# Patient Record
Sex: MALE | Race: WHITE | NOT HISPANIC OR LATINO | Employment: FULL TIME | URBAN - METROPOLITAN AREA
[De-identification: names, ages, dates, MRNs, and addresses within clinical notes are randomized per-mention and may not be internally consistent; named-entity substitution may affect disease eponyms.]

---

## 2017-08-10 ENCOUNTER — TELEPHONE (OUTPATIENT)
Dept: PRIMARY CARE CLINIC | Facility: CLINIC | Age: 52
End: 2017-08-10

## 2017-08-10 ENCOUNTER — OFFICE VISIT (OUTPATIENT)
Dept: PRIMARY CARE CLINIC | Facility: CLINIC | Age: 52
End: 2017-08-10
Payer: COMMERCIAL

## 2017-08-10 VITALS
DIASTOLIC BLOOD PRESSURE: 74 MMHG | OXYGEN SATURATION: 96 % | WEIGHT: 293 LBS | RESPIRATION RATE: 18 BRPM | BODY MASS INDEX: 41.02 KG/M2 | TEMPERATURE: 98 F | HEART RATE: 83 BPM | SYSTOLIC BLOOD PRESSURE: 114 MMHG | HEIGHT: 71 IN

## 2017-08-10 DIAGNOSIS — L02.11 ABSCESS OF SKIN OF NECK: Primary | ICD-10-CM

## 2017-08-10 PROCEDURE — 3008F BODY MASS INDEX DOCD: CPT | Mod: S$GLB,,, | Performed by: FAMILY MEDICINE

## 2017-08-10 PROCEDURE — 10060 I&D ABSCESS SIMPLE/SINGLE: CPT | Mod: S$GLB,,, | Performed by: FAMILY MEDICINE

## 2017-08-10 PROCEDURE — 99213 OFFICE O/P EST LOW 20 MIN: CPT | Mod: 25,S$GLB,, | Performed by: FAMILY MEDICINE

## 2017-08-10 RX ORDER — MELOXICAM 7.5 MG/1
1 TABLET ORAL DAILY PRN
COMMUNITY
Start: 2017-08-08 | End: 2017-09-14

## 2017-08-10 RX ORDER — LEVOTHYROXINE SODIUM 100 UG/1
100 TABLET ORAL DAILY
COMMUNITY
End: 2017-09-12 | Stop reason: SDUPTHER

## 2017-08-10 RX ORDER — ACETAMINOPHEN AND CODEINE PHOSPHATE 300; 30 MG/1; MG/1
1 TABLET ORAL DAILY PRN
Refills: 0 | COMMUNITY
Start: 2017-07-13 | End: 2017-09-14

## 2017-08-10 RX ORDER — HYDROCODONE BITARTRATE AND ACETAMINOPHEN 5; 325 MG/1; MG/1
1 TABLET ORAL EVERY 6 HOURS PRN
Qty: 15 TABLET | Refills: 0 | Status: SHIPPED | OUTPATIENT
Start: 2017-08-10 | End: 2017-09-14

## 2017-08-10 RX ORDER — PANTOPRAZOLE SODIUM 40 MG/1
1 TABLET, DELAYED RELEASE ORAL DAILY
COMMUNITY
Start: 2017-08-08 | End: 2017-09-14

## 2017-08-10 RX ORDER — CITALOPRAM 40 MG/1
40 TABLET, FILM COATED ORAL DAILY
COMMUNITY
End: 2018-03-15 | Stop reason: SDUPTHER

## 2017-08-10 RX ORDER — CLINDAMYCIN HYDROCHLORIDE 300 MG/1
1 CAPSULE ORAL EVERY 6 HOURS
COMMUNITY
Start: 2017-08-08 | End: 2017-09-14

## 2017-08-10 NOTE — PROGRESS NOTES
Subjective:       Patient ID: Mahendra Callahan is a 52 y.o. male.    Chief Complaint: Abscess (absecess on neck x1 week. states went to urgent care 2 days ago. )    Abscess   Chronicity:  NewProgression Since Onset: worsening  Size:  3-5cm  Location:  Head/neck  Associated Symptoms: no fever, no chills, no sweats  Characteristics: painful    Characteristics: not draining    Treatments Tried:  Nothing (went to  yesterday, got rx for abx, but hasn't started taking)  Relieved by:  Nothing    Review of Systems   Constitutional: Negative for chills and fever.       Objective:      Physical Exam   Constitutional: He appears well-developed and well-nourished.   Cardiovascular: Normal rate and regular rhythm.    Pulmonary/Chest: Effort normal and breath sounds normal.   Skin:   Fluctuant, tender 3cm abscess to left side of neck       Assessment:       1. Abscess of skin of neck        Plan:       Abscess of skin of neck  Comments:  pt instructed to start taking clindamycin  Orders:  -     INCISION AND DRAINAGE  -     hydrocodone-acetaminophen 5-325mg (NORCO) 5-325 mg per tablet; Take 1 tablet by mouth every 6 (six) hours as needed for Pain.  Dispense: 15 tablet; Refill: 0

## 2017-08-10 NOTE — PROCEDURES
"Incision & Drainage  Date/Time: 8/10/2017 1:59 PM  Performed by: APRIL BUENROSTRO  Authorized by: APRIL BUENROSTRO     Time out: Immediately prior to procedure a "time out" was called to verify the correct patient, procedure, equipment, support staff and site/side marked as required.    Consent Done?:  Yes (Verbal)    Type:  Abscess  Body area:  Head/neck  Anesthesia:  Local infiltration  Local anesthetic: lidocaine 1% without epinephrine  Anesthetic total (ml):  2  Scalpel size:  11  Incision type:  Single straight  Complexity:  Simple  Drainage:  Pus and bloody  Drainage amount:  Moderate  Wound treatment:  Wound packed  Packing material:  1/4 in gauze  Patient tolerance:  Patient tolerated the procedure well with no immediate complications    Pt instructed to leave dressing in place for 48h, then remove packing and change dressing daily      "

## 2017-09-12 RX ORDER — LEVOTHYROXINE SODIUM 100 UG/1
100 TABLET ORAL DAILY
Qty: 30 TABLET | Refills: 5 | Status: SHIPPED | OUTPATIENT
Start: 2017-09-12 | End: 2018-03-16 | Stop reason: SDUPTHER

## 2017-09-13 NOTE — TELEPHONE ENCOUNTER
----- Message from Mariana Murillo sent at 9/13/2017  1:08 PM CDT -----  Contact: Maya with [facility] phone [phone] fax [phone]  Maya with [facility] phone [phone] fax [phone], Calling for refill for Rx Lebothyroxine 125 mg quantity 30. Please advise. Thanks

## 2017-09-14 ENCOUNTER — TELEPHONE (OUTPATIENT)
Dept: PRIMARY CARE CLINIC | Facility: CLINIC | Age: 52
End: 2017-09-14

## 2017-09-14 ENCOUNTER — OFFICE VISIT (OUTPATIENT)
Dept: PRIMARY CARE CLINIC | Facility: CLINIC | Age: 52
End: 2017-09-14
Payer: COMMERCIAL

## 2017-09-14 VITALS
DIASTOLIC BLOOD PRESSURE: 71 MMHG | BODY MASS INDEX: 40.87 KG/M2 | HEART RATE: 88 BPM | TEMPERATURE: 98 F | SYSTOLIC BLOOD PRESSURE: 128 MMHG | RESPIRATION RATE: 18 BRPM | WEIGHT: 293 LBS | OXYGEN SATURATION: 97 %

## 2017-09-14 DIAGNOSIS — M1A.0720 CHRONIC GOUT OF LEFT FOOT, UNSPECIFIED CAUSE: Primary | ICD-10-CM

## 2017-09-14 PROBLEM — E03.9 HYPOTHYROIDISM: Status: ACTIVE | Noted: 2017-09-14

## 2017-09-14 PROBLEM — M10.9 GOUT OF LEFT FOOT: Status: ACTIVE | Noted: 2017-09-14

## 2017-09-14 PROCEDURE — 3008F BODY MASS INDEX DOCD: CPT | Mod: S$GLB,,, | Performed by: FAMILY MEDICINE

## 2017-09-14 PROCEDURE — 96372 THER/PROPH/DIAG INJ SC/IM: CPT | Mod: S$GLB,,, | Performed by: FAMILY MEDICINE

## 2017-09-14 PROCEDURE — 99213 OFFICE O/P EST LOW 20 MIN: CPT | Mod: 25,S$GLB,, | Performed by: FAMILY MEDICINE

## 2017-09-14 RX ORDER — METHYLPREDNISOLONE 4 MG/1
TABLET ORAL
Qty: 1 PACKAGE | Refills: 0 | Status: SHIPPED | OUTPATIENT
Start: 2017-09-14 | End: 2017-12-13

## 2017-09-14 RX ORDER — TRIAMCINOLONE ACETONIDE 40 MG/ML
80 INJECTION, SUSPENSION INTRA-ARTICULAR; INTRAMUSCULAR
Status: COMPLETED | OUTPATIENT
Start: 2017-09-14 | End: 2017-09-14

## 2017-09-14 RX ORDER — INDOMETHACIN 50 MG/1
50 CAPSULE ORAL 3 TIMES DAILY PRN
COMMUNITY
End: 2017-09-14 | Stop reason: SDUPTHER

## 2017-09-14 RX ORDER — INDOMETHACIN 50 MG/1
50 CAPSULE ORAL 3 TIMES DAILY PRN
Qty: 60 CAPSULE | Refills: 2 | Status: SHIPPED | OUTPATIENT
Start: 2017-09-14 | End: 2018-08-27

## 2017-09-14 RX ADMIN — TRIAMCINOLONE ACETONIDE 80 MG: 40 INJECTION, SUSPENSION INTRA-ARTICULAR; INTRAMUSCULAR at 05:09

## 2017-09-14 NOTE — TELEPHONE ENCOUNTER
----- Message from Reyes CURIEL Frisard sent at 9/14/2017 12:57 PM CDT -----  Contact: same  Patient called in and stated that he is suffering with his gout really bad and wanted to see if he could be seen today Thursday 9/14.  Patient call back number is [phone]

## 2017-09-14 NOTE — PROGRESS NOTES
Subjective:       Patient ID: Mahendra Callahan is a 52 y.o. male.    Chief Complaint: Gout and Other (pt states he had a red dot under his eye this morning and now it's all brused )    Gout flare for the past 2-3 days, getting worse. Pain mainly in left foot, has calf hurting b/c affecting way he is walking. Taking indomethacin, but not helping like it usually does      Review of Systems   Constitutional: Negative for chills and fever.   Respiratory: Negative for shortness of breath.    Cardiovascular: Negative for chest pain.       Objective:        Current Outpatient Prescriptions:     citalopram (CELEXA) 40 MG tablet, Take 40 mg by mouth once daily., Disp: , Rfl:     indomethacin (INDOCIN) 50 MG capsule, Take 1 capsule (50 mg total) by mouth 3 (three) times daily as needed (gout)., Disp: 60 capsule, Rfl: 2    levothyroxine (SYNTHROID) 100 MCG tablet, Take 1 tablet (100 mcg total) by mouth once daily., Disp: 30 tablet, Rfl: 5    methylPREDNISolone (MEDROL DOSEPACK) 4 mg tablet, use as directed, Disp: 1 Package, Rfl: 0    Current Facility-Administered Medications:     triamcinolone acetonide injection 80 mg, 80 mg, Intramuscular, 1 time in Clinic/HOD, Daniel Brooks MD  Vitals:    09/14/17 1639   BP: 128/71   BP Location: Left arm   Patient Position: Sitting   BP Method: Large (Automatic)   Pulse: 88   Resp: 18   Temp: 98.1 °F (36.7 °C)   TempSrc: Oral   SpO2: 97%   Weight: 132.9 kg (293 lb)     Physical Exam   Constitutional: He is oriented to person, place, and time. He appears well-developed and well-nourished.   HENT:   Head: Normocephalic and atraumatic.   Cardiovascular: Normal rate, regular rhythm and normal heart sounds.    Pulmonary/Chest: Effort normal and breath sounds normal.   Musculoskeletal: He exhibits no edema.   Left 1st MTP joint erythematous, warm, tender and edematous   Neurological: He is alert and oriented to person, place, and time.   Skin: Skin is warm and dry.   Vitals reviewed.       Assessment:       1. Chronic gout of left foot, unspecified cause        Plan:       Chronic gout of left foot, unspecified cause  Comments:  start medrol dose pack tomorrow; pt instructed not to take indomethacin while on steroids  Orders:  -     triamcinolone acetonide injection 80 mg; Inject 2 mLs (80 mg total) into the muscle one time.  -     methylPREDNISolone (MEDROL DOSEPACK) 4 mg tablet; use as directed  Dispense: 1 Package; Refill: 0  -     indomethacin (INDOCIN) 50 MG capsule; Take 1 capsule (50 mg total) by mouth 3 (three) times daily as needed (gout).  Dispense: 60 capsule; Refill: 2      Medication List with Changes/Refills   New Medications    METHYLPREDNISOLONE (MEDROL DOSEPACK) 4 MG TABLET    use as directed   Current Medications    CITALOPRAM (CELEXA) 40 MG TABLET    Take 40 mg by mouth once daily.    LEVOTHYROXINE (SYNTHROID) 100 MCG TABLET    Take 1 tablet (100 mcg total) by mouth once daily.   Changed and/or Refilled Medications    Modified Medication Previous Medication    INDOMETHACIN (INDOCIN) 50 MG CAPSULE indomethacin (INDOCIN) 50 MG capsule       Take 1 capsule (50 mg total) by mouth 3 (three) times daily as needed (gout).    Take 50 mg by mouth 3 (three) times daily as needed.   Discontinued Medications    ACETAMINOPHEN-CODEINE 300-30MG (TYLENOL #3) 300-30 MG TAB    Take 1 tablet by mouth daily as needed.    CLINDAMYCIN (CLEOCIN) 300 MG CAPSULE    Take 1 capsule by mouth every 6 (six) hours.     HYDROCODONE-ACETAMINOPHEN 5-325MG (NORCO) 5-325 MG PER TABLET    Take 1 tablet by mouth every 6 (six) hours as needed for Pain.    MELOXICAM (MOBIC) 7.5 MG TABLET    Take 1 tablet by mouth daily as needed.    PANTOPRAZOLE (PROTONIX) 40 MG TABLET    Take 1 tablet by mouth once daily.

## 2017-12-12 ENCOUNTER — NURSE TRIAGE (OUTPATIENT)
Dept: ADMINISTRATIVE | Facility: CLINIC | Age: 52
End: 2017-12-12

## 2017-12-13 ENCOUNTER — TELEPHONE (OUTPATIENT)
Dept: PRIMARY CARE CLINIC | Facility: CLINIC | Age: 52
End: 2017-12-13

## 2017-12-13 RX ORDER — METHYLPREDNISOLONE 4 MG/1
TABLET ORAL
Qty: 1 PACKAGE | Refills: 0 | Status: SHIPPED | OUTPATIENT
Start: 2017-12-13 | End: 2018-04-27

## 2017-12-13 NOTE — TELEPHONE ENCOUNTER
----- Message from Mariana Murillo sent at 12/12/2017  2:21 PM CST -----  Contact: Patient  Mahendra, patient [phone], Calling because he is on vacation in [place] and his gout is flaring up. Could you call something to him:    [facility]  [address]  [place], [place]  Phone [phone]

## 2017-12-13 NOTE — TELEPHONE ENCOUNTER
"    Reason for Disposition   Foot pain (all triage questions negative)    Answer Assessment - Initial Assessment Questions  1. ONSET: "When did the pain start?"       2 days ago   2. LOCATION: "Where is the pain located?"       Left foot   3. PAIN: "How bad is the pain?"    (Scale 1-10; or mild, moderate, severe)    -  MILD (1-3): doesn't interfere with normal activities     -  MODERATE (4-7): interferes with normal activities (e.g., work or school) or awakens from sleep, limping     -  SEVERE (8-10): excruciating pain, unable to do any normal activities, unable to walk      8-9  4. WORK OR EXERCISE: "Has there been any recent work or exercise that involved this part of the body?"       *No Answer*  5. CAUSE: "What do you think is causing the foot pain?"      *No Answer*  6. OTHER SYMPTOMS: "Do you have any other symptoms?" (e.g., leg pain, rash, fever, numbness)      no  7. PREGNANCY: "Is there any chance you are pregnant?" "When was your last menstrual period?"      *No Answer*    Protocols used:  FOOT PAIN-A-AH    Pt states he is on vacation and would like pain Rx called in for gout. Pt states he called PCP office earlier and did not receive call back. Please call pt to advise.   "

## 2018-03-15 RX ORDER — CITALOPRAM 40 MG/1
TABLET, FILM COATED ORAL
Qty: 30 TABLET | Refills: 5 | Status: SHIPPED | OUTPATIENT
Start: 2018-03-15 | End: 2018-08-29 | Stop reason: SDUPTHER

## 2018-03-16 RX ORDER — LEVOTHYROXINE SODIUM 100 UG/1
100 TABLET ORAL DAILY
Qty: 30 TABLET | Refills: 5 | Status: SHIPPED | OUTPATIENT
Start: 2018-03-16 | End: 2018-08-30 | Stop reason: SDUPTHER

## 2018-04-27 ENCOUNTER — OFFICE VISIT (OUTPATIENT)
Dept: PRIMARY CARE CLINIC | Facility: CLINIC | Age: 53
End: 2018-04-27
Payer: COMMERCIAL

## 2018-04-27 ENCOUNTER — TELEPHONE (OUTPATIENT)
Dept: PRIMARY CARE CLINIC | Facility: CLINIC | Age: 53
End: 2018-04-27

## 2018-04-27 VITALS
OXYGEN SATURATION: 96 % | RESPIRATION RATE: 18 BRPM | HEART RATE: 92 BPM | HEIGHT: 66 IN | SYSTOLIC BLOOD PRESSURE: 126 MMHG | TEMPERATURE: 98 F | DIASTOLIC BLOOD PRESSURE: 77 MMHG | WEIGHT: 295 LBS | BODY MASS INDEX: 47.41 KG/M2

## 2018-04-27 DIAGNOSIS — M79.605 PAIN IN BOTH LOWER EXTREMITIES: ICD-10-CM

## 2018-04-27 DIAGNOSIS — Z13.6 ENCOUNTER FOR SCREENING FOR CARDIOVASCULAR DISORDERS: ICD-10-CM

## 2018-04-27 DIAGNOSIS — R63.5 WEIGHT GAIN: ICD-10-CM

## 2018-04-27 DIAGNOSIS — E03.9 HYPOTHYROIDISM, UNSPECIFIED TYPE: ICD-10-CM

## 2018-04-27 DIAGNOSIS — Z13.1 SCREENING FOR DIABETES MELLITUS: ICD-10-CM

## 2018-04-27 DIAGNOSIS — M79.604 PAIN IN BOTH LOWER EXTREMITIES: ICD-10-CM

## 2018-04-27 DIAGNOSIS — J01.00 ACUTE NON-RECURRENT MAXILLARY SINUSITIS: Primary | ICD-10-CM

## 2018-04-27 DIAGNOSIS — E66.01 MORBID OBESITY WITH BMI OF 45.0-49.9, ADULT: ICD-10-CM

## 2018-04-27 DIAGNOSIS — Z12.5 PROSTATE CANCER SCREENING: ICD-10-CM

## 2018-04-27 DIAGNOSIS — M1A.0720 CHRONIC GOUT OF LEFT FOOT, UNSPECIFIED CAUSE: ICD-10-CM

## 2018-04-27 DIAGNOSIS — Z11.59 NEED FOR HEPATITIS C SCREENING TEST: ICD-10-CM

## 2018-04-27 PROCEDURE — 99214 OFFICE O/P EST MOD 30 MIN: CPT | Mod: 25,S$GLB,, | Performed by: FAMILY MEDICINE

## 2018-04-27 PROCEDURE — 96372 THER/PROPH/DIAG INJ SC/IM: CPT | Mod: S$GLB,,, | Performed by: FAMILY MEDICINE

## 2018-04-27 PROCEDURE — 99999 PR PBB SHADOW E&M-EST. PATIENT-LVL III: CPT | Mod: PBBFAC,,, | Performed by: FAMILY MEDICINE

## 2018-04-27 RX ORDER — CLARITHROMYCIN 500 MG/1
500 TABLET, FILM COATED ORAL EVERY 12 HOURS
Qty: 20 TABLET | Refills: 0 | Status: SHIPPED | OUTPATIENT
Start: 2018-04-27 | End: 2018-05-07 | Stop reason: ALTCHOICE

## 2018-04-27 RX ORDER — BETAMETHASONE SODIUM PHOSPHATE AND BETAMETHASONE ACETATE 3; 3 MG/ML; MG/ML
12 INJECTION, SUSPENSION INTRA-ARTICULAR; INTRALESIONAL; INTRAMUSCULAR; SOFT TISSUE
Status: COMPLETED | OUTPATIENT
Start: 2018-04-27 | End: 2018-04-27

## 2018-04-27 RX ORDER — HYDROCODONE BITARTRATE AND HOMATROPINE METHYLBROMIDE ORAL SOLUTION 5; 1.5 MG/5ML; MG/5ML
5 LIQUID ORAL EVERY 6 HOURS PRN
Qty: 120 ML | Refills: 0 | Status: SHIPPED | OUTPATIENT
Start: 2018-04-27 | End: 2018-05-02

## 2018-04-27 RX ADMIN — BETAMETHASONE SODIUM PHOSPHATE AND BETAMETHASONE ACETATE 12 MG: 3; 3 INJECTION, SUSPENSION INTRA-ARTICULAR; INTRALESIONAL; INTRAMUSCULAR; SOFT TISSUE at 10:04

## 2018-04-27 NOTE — TELEPHONE ENCOUNTER
----- Message from Nedra Melgoza sent at 4/27/2018 10:45 AM CDT -----  Type:  Pharmacy Calling to Clarify an RX    Name of Caller:  Cristóbal  Pharmacy Name:  [facility]  Prescription Name: clarithromycin (BIAXIN) 500 MG tablet  What do they need to clarify?:    Best Call Back Number:  [phone]  Additional Information:  Medication has an interaction with Citlaotram states it can cause arhythmia,  contact pharmacy to advise

## 2018-04-27 NOTE — PROGRESS NOTES
"Subjective:       Patient ID: Mahendra Callahan is a 52 y.o. male.    Chief Complaint: Sore Throat (patient says he feels like he swallowed razor blades )    Also complains of weight gain and difficulty losing weight.  Says he and his wife have been dieting, but upon further questioning, turns out that he is still eating a fairly high carbohydrate diet, including biscuits, pasta, bread, cookies and milk.  Complains of generalized fatigue and myalgias, particularly in his legs.  Due for labs.  He is to take a slightly higher dose of levothyroxine, thinks it might be related to that.      Sore Throat    This is a new problem. The current episode started in the past 7 days. The problem has been gradually worsening. Neither side of throat is experiencing more pain than the other. The maximum temperature recorded prior to his arrival was 101 - 101.9 F. The fever has been present for 1 to 2 days. The pain is moderate. Associated symptoms include congestion, coughing, headaches and trouble swallowing. Pertinent negatives include no ear discharge, shortness of breath or vomiting. He has had no exposure to strep or mono.     Review of Systems   Constitutional: Positive for chills, diaphoresis, fatigue and fever.   HENT: Positive for congestion, sore throat and trouble swallowing. Negative for ear discharge.    Eyes: Negative for visual disturbance.   Respiratory: Positive for cough. Negative for shortness of breath.    Gastrointestinal: Negative for vomiting.   Genitourinary: Negative for difficulty urinating.   Musculoskeletal: Positive for myalgias.   Skin: Negative for rash.   Neurological: Positive for headaches.       Objective:      Vitals:    04/27/18 1000   BP: 126/77   BP Location: Left arm   Patient Position: Sitting   BP Method: Large (Automatic)   Pulse: 92   Resp: 18   Temp: 98 °F (36.7 °C)   TempSrc: Oral   SpO2: 96%   Weight: 133.8 kg (295 lb)   Height: 5' 6" (1.676 m)     Physical Exam   Constitutional: He is " oriented to person, place, and time. He appears well-developed and well-nourished.   HENT:   Head: Normocephalic and atraumatic.   Right Ear: Tympanic membrane normal.   Left Ear: Tympanic membrane normal.   Nose: Right sinus exhibits maxillary sinus tenderness. Left sinus exhibits maxillary sinus tenderness.   Mouth/Throat: Oropharynx is clear and moist and mucous membranes are normal.   Eyes: EOM are normal.   Neck: Neck supple. No JVD present.   Cardiovascular: Normal rate, regular rhythm and normal heart sounds.    Pulmonary/Chest: Effort normal and breath sounds normal.   Musculoskeletal: He exhibits no edema.   Neurological: He is alert and oriented to person, place, and time.   Skin: Skin is warm and dry.   Psychiatric: He has a normal mood and affect. His behavior is normal.       Assessment:       1. Acute non-recurrent maxillary sinusitis    2. Hypothyroidism, unspecified type    3. Chronic gout of left foot, unspecified cause    4. Encounter for screening for cardiovascular disorders    5. Need for hepatitis C screening test    6. Weight gain    7. Screening for diabetes mellitus    8. Morbid obesity with BMI of 45.0-49.9, adult    9. Prostate cancer screening    10. Pain in both lower extremities        Plan:       Acute non-recurrent maxillary sinusitis  -     betamethasone acetate-betamethasone sodium phosphate injection 12 mg; Inject 2 mLs (12 mg total) into the muscle one time.  -     clarithromycin (BIAXIN) 500 MG tablet; Take 1 tablet (500 mg total) by mouth every 12 (twelve) hours.  Dispense: 20 tablet; Refill: 0  -     hydrocodone-homatropine 5-1.5 mg/5 ml (HYCODAN) 5-1.5 mg/5 mL Syrp; Take 5 mLs by mouth every 6 (six) hours as needed (cough).  Dispense: 120 mL; Refill: 0    Hypothyroidism, unspecified type  -     CBC auto differential; Future; Expected date: 05/11/2018  -     TSH; Future; Expected date: 05/11/2018  -     T4, free; Future; Expected date: 05/11/2018  -     T3; Future; Expected  date: 05/11/2018    Chronic gout of left foot, unspecified cause  -     Uric acid; Future; Expected date: 05/11/2018    Encounter for screening for cardiovascular disorders  -     CBC auto differential; Future; Expected date: 05/11/2018  -     Lipid panel; Future; Expected date: 05/11/2018    Need for hepatitis C screening test  -     Hepatitis C antibody; Future; Expected date: 05/11/2018    Weight gain  -     Comprehensive metabolic panel; Future; Expected date: 05/11/2018  -     Hemoglobin A1c; Future; Expected date: 05/11/2018    Screening for diabetes mellitus  -     Hemoglobin A1c; Future; Expected date: 05/11/2018    Morbid obesity with BMI of 45.0-49.9, adult  Comments:  stressed importance of dramatically cutting carb intake to facilitate weight loss  Orders:  -     Hemoglobin A1c; Future; Expected date: 05/11/2018    Prostate cancer screening  -     PSA, Screening; Future; Expected date: 05/11/2018    Pain in both lower extremities  -     Vitamin D; Future; Expected date: 05/11/2018      Medication List with Changes/Refills   New Medications    CLARITHROMYCIN (BIAXIN) 500 MG TABLET    Take 1 tablet (500 mg total) by mouth every 12 (twelve) hours.    HYDROCODONE-HOMATROPINE 5-1.5 MG/5 ML (HYCODAN) 5-1.5 MG/5 ML SYRP    Take 5 mLs by mouth every 6 (six) hours as needed (cough).   Current Medications    CITALOPRAM (CELEXA) 40 MG TABLET    TAKE ONE TABLET BY MOUTH DAILY    INDOMETHACIN (INDOCIN) 50 MG CAPSULE    Take 1 capsule (50 mg total) by mouth 3 (three) times daily as needed (gout).    LEVOTHYROXINE (SYNTHROID) 100 MCG TABLET    TAKE 1 TABLET (100 MCG TOTAL) BY MOUTH ONCE DAILY.   Discontinued Medications    METHYLPREDNISOLONE (MEDROL DOSEPACK) 4 MG TABLET    use as directed

## 2018-05-02 ENCOUNTER — TELEPHONE (OUTPATIENT)
Dept: PRIMARY CARE CLINIC | Facility: CLINIC | Age: 53
End: 2018-05-02

## 2018-05-02 RX ORDER — PROMETHAZINE HYDROCHLORIDE AND DEXTROMETHORPHAN HYDROBROMIDE 6.25; 15 MG/5ML; MG/5ML
5 SYRUP ORAL EVERY 6 HOURS PRN
Qty: 180 ML | Refills: 1 | Status: SHIPPED | OUTPATIENT
Start: 2018-05-02 | End: 2018-05-04 | Stop reason: SDUPTHER

## 2018-05-02 NOTE — TELEPHONE ENCOUNTER
----- Message from Mariana Murillo sent at 5/2/2018  2:12 PM CDT -----  Contact: Patient  Type: Needs Medical Advice    Who Called:  Mahendra,patient  Symptoms (please be specific):  Hallucinating  How long has patient had these symptoms:  Since he started taking Rx hydrocodone-homatropine 5-1.5 mg/5 ml (HYCODAN) 5-1.5 mg/5 mL Syrp 120 mL   Pharmacy name and phone #:    [facility] #[id number] - [place] - [address]  [address] [place] [address]  Phone: [phone] Fax: [phone]  Best Call Back Number: [phone]  Additional Information: Is there something else he can take. Please call him. Thanks.

## 2018-05-04 ENCOUNTER — OFFICE VISIT (OUTPATIENT)
Dept: PRIMARY CARE CLINIC | Facility: CLINIC | Age: 53
End: 2018-05-04
Payer: COMMERCIAL

## 2018-05-04 VITALS
HEIGHT: 69 IN | BODY MASS INDEX: 42.36 KG/M2 | WEIGHT: 286 LBS | RESPIRATION RATE: 18 BRPM | TEMPERATURE: 99 F | HEART RATE: 66 BPM | SYSTOLIC BLOOD PRESSURE: 137 MMHG | OXYGEN SATURATION: 96 % | DIASTOLIC BLOOD PRESSURE: 84 MMHG

## 2018-05-04 DIAGNOSIS — J40 BRONCHITIS: ICD-10-CM

## 2018-05-04 DIAGNOSIS — J32.9 SINUSITIS, UNSPECIFIED CHRONICITY, UNSPECIFIED LOCATION: Primary | ICD-10-CM

## 2018-05-04 PROCEDURE — 99999 PR PBB SHADOW E&M-EST. PATIENT-LVL IV: CPT | Mod: PBBFAC,,, | Performed by: INTERNAL MEDICINE

## 2018-05-04 PROCEDURE — 96372 THER/PROPH/DIAG INJ SC/IM: CPT | Mod: S$GLB,,, | Performed by: INTERNAL MEDICINE

## 2018-05-04 PROCEDURE — 3008F BODY MASS INDEX DOCD: CPT | Mod: CPTII,S$GLB,, | Performed by: INTERNAL MEDICINE

## 2018-05-04 PROCEDURE — 99213 OFFICE O/P EST LOW 20 MIN: CPT | Mod: 25,S$GLB,, | Performed by: INTERNAL MEDICINE

## 2018-05-04 RX ORDER — PREDNISONE 20 MG/1
20 TABLET ORAL 2 TIMES DAILY
Qty: 12 TABLET | Refills: 0 | Status: ON HOLD | OUTPATIENT
Start: 2018-05-04 | End: 2018-05-09 | Stop reason: HOSPADM

## 2018-05-04 RX ORDER — BETAMETHASONE SODIUM PHOSPHATE AND BETAMETHASONE ACETATE 3; 3 MG/ML; MG/ML
6 INJECTION, SUSPENSION INTRA-ARTICULAR; INTRALESIONAL; INTRAMUSCULAR; SOFT TISSUE
Status: COMPLETED | OUTPATIENT
Start: 2018-05-04 | End: 2018-05-04

## 2018-05-04 RX ORDER — ALBUTEROL SULFATE 90 UG/1
2 AEROSOL, METERED RESPIRATORY (INHALATION) EVERY 6 HOURS PRN
Qty: 18 G | Refills: 2 | Status: SHIPPED | OUTPATIENT
Start: 2018-05-04 | End: 2018-05-21 | Stop reason: SDUPTHER

## 2018-05-04 RX ORDER — LINCOMYCIN HYDROCHLORIDE 300 MG/ML
600 INJECTION, SOLUTION INTRAMUSCULAR; INTRAVENOUS; SUBCONJUNCTIVAL
Status: COMPLETED | OUTPATIENT
Start: 2018-05-04 | End: 2018-05-04

## 2018-05-04 RX ORDER — HYDROCODONE BITARTRATE AND HOMATROPINE METHYLBROMIDE ORAL SOLUTION 5; 1.5 MG/5ML; MG/5ML
5 LIQUID ORAL EVERY 4 HOURS PRN
Qty: 150 ML | Refills: 0 | Status: SHIPPED | OUTPATIENT
Start: 2018-05-04 | End: 2018-08-27

## 2018-05-04 RX ORDER — SULFAMETHOXAZOLE AND TRIMETHOPRIM 800; 160 MG/1; MG/1
1 TABLET ORAL 2 TIMES DAILY
Qty: 20 TABLET | Refills: 0 | Status: SHIPPED | OUTPATIENT
Start: 2018-05-04 | End: 2018-05-04 | Stop reason: SDUPTHER

## 2018-05-04 RX ORDER — SULFAMETHOXAZOLE AND TRIMETHOPRIM 800; 160 MG/1; MG/1
1 TABLET ORAL 2 TIMES DAILY
Qty: 20 TABLET | Refills: 0 | Status: SHIPPED | OUTPATIENT
Start: 2018-05-04 | End: 2018-05-07 | Stop reason: ALTCHOICE

## 2018-05-04 RX ORDER — ALBUTEROL SULFATE 90 UG/1
2 AEROSOL, METERED RESPIRATORY (INHALATION) EVERY 6 HOURS PRN
Qty: 18 G | Refills: 2 | Status: SHIPPED | OUTPATIENT
Start: 2018-05-04 | End: 2018-05-04 | Stop reason: SDUPTHER

## 2018-05-04 RX ADMIN — LINCOMYCIN HYDROCHLORIDE 600 MG: 300 INJECTION, SOLUTION INTRAMUSCULAR; INTRAVENOUS; SUBCONJUNCTIVAL at 01:05

## 2018-05-04 RX ADMIN — BETAMETHASONE SODIUM PHOSPHATE AND BETAMETHASONE ACETATE 6 MG: 3; 3 INJECTION, SUSPENSION INTRA-ARTICULAR; INTRALESIONAL; INTRAMUSCULAR; SOFT TISSUE at 01:05

## 2018-05-04 NOTE — PROGRESS NOTES
Subjective:       Patient ID: Mahendra Callahan is a 52 y.o. male.    Chief Complaint: Not Feeling Better (feeling worse)    HPI  Pt c/o not better still coughing congestion and osb and HA no n/v/d no smoke   Review of Systems    Objective:      Physical Exam   Constitutional: He is oriented to person, place, and time. He appears well-developed and well-nourished. No distress.   HENT:   Head: Normocephalic and atraumatic.   Right Ear: External ear normal.   Left Ear: External ear normal.   Mouth/Throat: Oropharynx is clear and moist. No oropharyngeal exudate.   Nasal congestion   Eyes: Conjunctivae and EOM are normal. Pupils are equal, round, and reactive to light. Right eye exhibits no discharge. Left eye exhibits no discharge.   Neck: Normal range of motion. Neck supple. No thyromegaly present.   Cardiovascular: Normal rate, regular rhythm, normal heart sounds and intact distal pulses.  Exam reveals no gallop and no friction rub.    No murmur heard.  Pulmonary/Chest: Effort normal. No respiratory distress. He has no wheezes. He has rales (bilateral rhonchi and exp wheezing). He exhibits no tenderness.   Abdominal: Soft. Bowel sounds are normal. He exhibits no distension. There is no tenderness. There is no rebound and no guarding.   Musculoskeletal: Normal range of motion. He exhibits no edema, tenderness or deformity.   Lymphadenopathy:     He has no cervical adenopathy.   Neurological: He is alert and oriented to person, place, and time.   Skin: Skin is warm and dry. Capillary refill takes less than 2 seconds. No rash noted. No erythema.   Psychiatric: He has a normal mood and affect. Judgment and thought content normal.   Nursing note and vitals reviewed.      Assessment:       1. Sinusitis, unspecified chronicity, unspecified location    2. Bronchitis        Plan:       Sinusitis, unspecified chronicity, unspecified location    Bronchitis  -     betamethasone acetate-betamethasone sodium phosphate injection 6 mg;  Inject 1 mL (6 mg total) into the muscle one time.  -     lincomycin injection 600 mg; Inject 2 mLs (600 mg total) into the muscle one time.  -     hydrocodone-homatropine 5-1.5 mg/5 ml (HYCODAN) 5-1.5 mg/5 mL Syrp; Take 5 mLs by mouth every 4 (four) hours as needed.  Dispense: 150 mL; Refill: 0  -     Discontinue: albuterol 90 mcg/actuation inhaler; Inhale 2 puffs into the lungs every 6 (six) hours as needed for Wheezing. Rescue  Dispense: 18 g; Refill: 2  -     predniSONE (DELTASONE) 20 MG tablet; Take 1 tablet (20 mg total) by mouth 2 (two) times daily.  Dispense: 12 tablet; Refill: 0  -     Discontinue: sulfamethoxazole-trimethoprim 800-160mg (BACTRIM DS) 800-160 mg Tab; Take 1 tablet by mouth 2 (two) times daily.  Dispense: 20 tablet; Refill: 0  -     albuterol 90 mcg/actuation inhaler; Inhale 2 puffs into the lungs every 6 (six) hours as needed for Wheezing. Rescue  Dispense: 18 g; Refill: 2  -     sulfamethoxazole-trimethoprim 800-160mg (BACTRIM DS) 800-160 mg Tab; Take 1 tablet by mouth 2 (two) times daily.  Dispense: 20 tablet; Refill: 0

## 2018-05-07 ENCOUNTER — OFFICE VISIT (OUTPATIENT)
Dept: PRIMARY CARE CLINIC | Facility: CLINIC | Age: 53
End: 2018-05-07
Payer: COMMERCIAL

## 2018-05-07 ENCOUNTER — TELEPHONE (OUTPATIENT)
Dept: PRIMARY CARE CLINIC | Facility: CLINIC | Age: 53
End: 2018-05-07

## 2018-05-07 VITALS
OXYGEN SATURATION: 98 % | TEMPERATURE: 99 F | RESPIRATION RATE: 18 BRPM | SYSTOLIC BLOOD PRESSURE: 119 MMHG | HEART RATE: 77 BPM | WEIGHT: 285 LBS | BODY MASS INDEX: 39.9 KG/M2 | DIASTOLIC BLOOD PRESSURE: 84 MMHG | HEIGHT: 71 IN

## 2018-05-07 DIAGNOSIS — J40 BRONCHITIS: Primary | ICD-10-CM

## 2018-05-07 DIAGNOSIS — R61 NIGHT SWEAT: ICD-10-CM

## 2018-05-07 DIAGNOSIS — J44.89 BRONCHITIS WITH AIRWAY OBSTRUCTION: Primary | ICD-10-CM

## 2018-05-07 DIAGNOSIS — R07.81 PLEURITIC CHEST PAIN: ICD-10-CM

## 2018-05-07 DIAGNOSIS — J32.9 SINUSITIS, UNSPECIFIED CHRONICITY, UNSPECIFIED LOCATION: ICD-10-CM

## 2018-05-07 PROCEDURE — 99999 PR PBB SHADOW E&M-EST. PATIENT-LVL III: CPT | Mod: PBBFAC,,, | Performed by: INTERNAL MEDICINE

## 2018-05-07 PROCEDURE — 99212 OFFICE O/P EST SF 10 MIN: CPT | Mod: S$GLB,,, | Performed by: INTERNAL MEDICINE

## 2018-05-07 PROCEDURE — 3008F BODY MASS INDEX DOCD: CPT | Mod: CPTII,S$GLB,, | Performed by: INTERNAL MEDICINE

## 2018-05-07 NOTE — PROGRESS NOTES
Subjective:       Patient ID: Mahendra Callahan is a 52 y.o. male.    Chief Complaint: Not Better and Shortness of Breath    HPI pt is not any better still sick sob coughing ahs to sit up night chest hurt when cough and severe night sweat pt had 2 celestone injections lincocin and on Biaxin then Bactrim DS not better sent to ER forfurther evaluations and treatment may need few days in hospital for IV meds and aresol tx and also check cardiac status  Review of Systems    Objective:      Physical Exam   Constitutional: He is oriented to person, place, and time. He appears well-developed and well-nourished. No distress.   overwt  Coughing sob   HENT:   Head: Normocephalic and atraumatic.   Right Ear: External ear normal.   Left Ear: External ear normal.   Nose: Nose normal.   Mouth/Throat: Oropharynx is clear and moist. No oropharyngeal exudate.   Eyes: Conjunctivae and EOM are normal. Pupils are equal, round, and reactive to light. Right eye exhibits no discharge. Left eye exhibits no discharge.   Neck: Normal range of motion. Neck supple. No thyromegaly present.   Cardiovascular: Normal rate, regular rhythm, normal heart sounds and intact distal pulses.  Exam reveals no gallop and no friction rub.    No murmur heard.  Pulmonary/Chest: Effort normal. No respiratory distress. He has no wheezes. He has rales (diffused biltaeral rhonchi and wheezing). He exhibits no tenderness.   Abdominal: Soft. Bowel sounds are normal. He exhibits no distension. There is no tenderness. There is no rebound and no guarding.   Musculoskeletal: Normal range of motion. He exhibits no edema, tenderness or deformity.   Lymphadenopathy:     He has no cervical adenopathy.   Neurological: He is alert and oriented to person, place, and time.   Skin: Skin is warm and dry. Capillary refill takes less than 2 seconds. No rash noted. No erythema.   Psychiatric: He has a normal mood and affect. Judgment and thought content normal.   Nursing note and vitals  reviewed.      Assessment:       1. Bronchitis with airway obstruction    2. Sinusitis, unspecified chronicity, unspecified location    3. Pleuritic chest pain    4. Night sweat        Plan:       Bronchitis with airway obstruction  Comments:  pt not better with out pt treatments send to ER for further tx and evaluation poss admit for IV meds and aresol tx and send nebulizer for home use    Sinusitis, unspecified chronicity, unspecified location    Pleuritic chest pain    Night sweat

## 2018-05-09 ENCOUNTER — TELEPHONE (OUTPATIENT)
Dept: PRIMARY CARE CLINIC | Facility: CLINIC | Age: 53
End: 2018-05-09

## 2018-05-09 DIAGNOSIS — J44.89 BRONCHITIS WITH AIRWAY OBSTRUCTION: Primary | ICD-10-CM

## 2018-05-09 NOTE — TELEPHONE ENCOUNTER
----- Message from Daniel Brooks MD sent at 5/9/2018  3:27 PM CDT -----  Thanks, we'll take care of it.  Kulwinder, please schedule patient for PFTs at [facility] in [place] in 2-3 weeks.  RMT  ----- Message -----  From: Enrrique Stein MD  Sent: 5/9/2018  12:20 PM  To: Rafaela Caraabllo LPN, MD Daniel Qiu:  We will arrange Pulmonary Clinic follow up at [facility] in the next 4-6 weeks either with me or our [facility].  In the meantime, can your office arrange for him to have full PFTs (with/without bronchodilator) at [place]?  Thanks, DET

## 2018-05-21 ENCOUNTER — OFFICE VISIT (OUTPATIENT)
Dept: PRIMARY CARE CLINIC | Facility: CLINIC | Age: 53
End: 2018-05-21
Payer: COMMERCIAL

## 2018-05-21 VITALS
WEIGHT: 289 LBS | RESPIRATION RATE: 18 BRPM | HEART RATE: 95 BPM | OXYGEN SATURATION: 97 % | TEMPERATURE: 98 F | DIASTOLIC BLOOD PRESSURE: 71 MMHG | BODY MASS INDEX: 42.8 KG/M2 | SYSTOLIC BLOOD PRESSURE: 100 MMHG | HEIGHT: 69 IN

## 2018-05-21 DIAGNOSIS — R53.83 LETHARGY: Primary | ICD-10-CM

## 2018-05-21 DIAGNOSIS — J40 BRONCHITIS: ICD-10-CM

## 2018-05-21 DIAGNOSIS — M79.601 RIGHT ARM PAIN: ICD-10-CM

## 2018-05-21 DIAGNOSIS — R53.83 FATIGUE, UNSPECIFIED TYPE: ICD-10-CM

## 2018-05-21 DIAGNOSIS — M10.9 GOUT, UNSPECIFIED CAUSE, UNSPECIFIED CHRONICITY, UNSPECIFIED SITE: ICD-10-CM

## 2018-05-21 DIAGNOSIS — F32.A DEPRESSION, UNSPECIFIED DEPRESSION TYPE: ICD-10-CM

## 2018-05-21 DIAGNOSIS — E03.9 HYPOTHYROIDISM, UNSPECIFIED TYPE: ICD-10-CM

## 2018-05-21 DIAGNOSIS — K21.9 GASTROESOPHAGEAL REFLUX DISEASE, ESOPHAGITIS PRESENCE NOT SPECIFIED: ICD-10-CM

## 2018-05-21 DIAGNOSIS — R06.00 DYSPNEA, UNSPECIFIED TYPE: ICD-10-CM

## 2018-05-21 DIAGNOSIS — E66.01 MORBID OBESITY: ICD-10-CM

## 2018-05-21 PROCEDURE — 3008F BODY MASS INDEX DOCD: CPT | Mod: CPTII,S$GLB,, | Performed by: FAMILY MEDICINE

## 2018-05-21 PROCEDURE — 99213 OFFICE O/P EST LOW 20 MIN: CPT | Mod: 25,S$GLB,, | Performed by: FAMILY MEDICINE

## 2018-05-21 PROCEDURE — 96372 THER/PROPH/DIAG INJ SC/IM: CPT | Mod: S$GLB,,, | Performed by: FAMILY MEDICINE

## 2018-05-21 PROCEDURE — 99999 PR PBB SHADOW E&M-EST. PATIENT-LVL III: CPT | Mod: PBBFAC,,, | Performed by: FAMILY MEDICINE

## 2018-05-21 RX ORDER — CYANOCOBALAMIN 1000 UG/ML
1000 INJECTION, SOLUTION INTRAMUSCULAR; SUBCUTANEOUS
Status: COMPLETED | OUTPATIENT
Start: 2018-05-21 | End: 2018-05-21

## 2018-05-21 RX ORDER — OMEPRAZOLE 40 MG/1
40 CAPSULE, DELAYED RELEASE ORAL DAILY
Qty: 30 CAPSULE | Refills: 5 | Status: SHIPPED | OUTPATIENT
Start: 2018-05-21 | End: 2018-08-27

## 2018-05-21 RX ORDER — HYDROCODONE BITARTRATE AND ACETAMINOPHEN 5; 325 MG/1; MG/1
TABLET ORAL
Qty: 30 TABLET | Refills: 0 | Status: SHIPPED | OUTPATIENT
Start: 2018-05-21 | End: 2018-08-27

## 2018-05-21 RX ORDER — ALBUTEROL SULFATE 90 UG/1
2 AEROSOL, METERED RESPIRATORY (INHALATION) EVERY 6 HOURS PRN
Qty: 18 G | Refills: 2 | Status: SHIPPED | OUTPATIENT
Start: 2018-05-21 | End: 2018-08-27

## 2018-05-21 RX ORDER — NYSTATIN 100000 [USP'U]/ML
5 SUSPENSION ORAL 4 TIMES DAILY
Qty: 200 ML | Refills: 0 | Status: SHIPPED | OUTPATIENT
Start: 2018-05-21 | End: 2018-05-31

## 2018-05-21 RX ORDER — MECLIZINE HYDROCHLORIDE 25 MG/1
25 TABLET ORAL 3 TIMES DAILY PRN
Qty: 30 TABLET | Refills: 1 | Status: SHIPPED | OUTPATIENT
Start: 2018-05-21 | End: 2018-08-27

## 2018-05-21 RX ADMIN — CYANOCOBALAMIN 1000 MCG: 1000 INJECTION, SOLUTION INTRAMUSCULAR; SUBCUTANEOUS at 06:05

## 2018-05-21 NOTE — PROGRESS NOTES
Pt ID verified by  and Name. Allergies verified. B12 1cc to L deltoid per MD order. No adverse reactions noted. Pt tolerated well.

## 2018-05-21 NOTE — PROGRESS NOTES
Subjective:       Patient ID: Mahendra Callahan is a 53 y.o. male.    Chief Complaint: Hospital Follow Up; Gastroesophageal Reflux; and Arm Pain    HPI:52 yo WM sent to ER --2 weeks ago. In ER given breathing tx and admitted. In hospital given more breathing tx, IV fluid, IV ab. Had ECHO showing diastolic dysfunction. Hx smoking.      At night throat feels almost closed shut. Uses chloraseptic. Constantly dizzy. Pain right arm since lab drawn 1 AM--pain into right hand and up right shoulder. Took dizzy pills from home. Took pain pill.        Pt quit meds got at hospital --yesterday felt dizziness due to those meds because was unable to work. If out in heat 10 minutes is worn out. On doxycycline, Bactrim and pred.      ROS:  Skin: no psoriasis, eczema, skin cancer  HEENT: No headache, ocular pain, blurred vision, diplopia, epistaxis,+ hoarseness change in voice, + hypothyroidism   Lung: No pneumonia, asthma, Tb, wheezing, SOB,Had infection and nodules in lung + snoring   Heart: No chest pain, ankle edema, palpitations, MI, omar murmur, hypertension, hyperlipidemia ECHO chronic diastolic dysfunctgion   Abdomen: No nausea, vomiting,  constipation, ulcers, hepatitis, gallbladder disease, melena, hematochezia, hematemesis +GERD,Hx heartburn and diarrhea  : no UTI, renal disease, stones  MS: no fractures, O/A, lupus, rheumatoid, +gout  Neuro:+ dizziness, LOC, seizures   No diabetes, no anemia, no anxiety, + depression     Objective:   Physical Exam:  General: Well nourished, well developed, no acute distress + overweight   Skin: No lesions  HEENT: Eyes PERRLA, EOM intact, nose clear D/C, throat +1/4 erythematous   NECK: Supple, no bruits, No JVD, no nodes  Lungs: Clear, no rales, rhonchi, wheezing + coarse cough   Heart: Regular rate and rhythm, no murmurs, gallops, or rubs  Abdomen: flat, bowel sounds positive, no tenderness, or organomegaly  MS: Range of motion and muscle strength intact +  Arthritis   Neuro: Alert, CN  intact, oriented X 3  Extremities: No cyanosis, clubbing, or edema         Assessment:       1. Lethargy    2. Fatigue, unspecified type    3. Gastroesophageal reflux disease, esophagitis presence not specified    4. Right arm pain    5. Gout, unspecified cause, unspecified chronicity, unspecified site    6. Hypothyroidism, unspecified type    7. Depression, unspecified depression type    8. Morbid obesity    9. Dyspnea, unspecified type    10. Bronchitis        Plan:       Lethargy    Fatigue, unspecified type    Gastroesophageal reflux disease, esophagitis presence not specified    Right arm pain    Gout, unspecified cause, unspecified chronicity, unspecified site    Hypothyroidism, unspecified type    Depression, unspecified depression type    Morbid obesity    Dyspnea, unspecified type    Bronchitis      due to her lethargy fatigue and dyspnea--redo labs CBC CMP lipid T4 TSH UA--- had set rate BREANN rheumatoid factor RPR  Take B12 shot take multivitamin, nystatin swish and swallow Antivert for dizziness to cover half by mouth every 6 hours when necessary pain Right arm pain stays sent to orthopedist  GERD use omeprazole avoid smoking and alcohol caffeine NSAIDs stress and carbonated drinks May need to add Carafate and Tums

## 2018-05-22 ENCOUNTER — TELEPHONE (OUTPATIENT)
Dept: PRIMARY CARE CLINIC | Facility: CLINIC | Age: 53
End: 2018-05-22

## 2018-05-22 NOTE — TELEPHONE ENCOUNTER
----- Message from Gisselle Escudero sent at 5/22/2018  9:54 AM CDT -----  Contact: [facility]  Doctor did not include diagnoses code on the patients script for tramadol 50 mg.  Please call [phone].  Thank you!

## 2018-06-07 ENCOUNTER — TELEPHONE (OUTPATIENT)
Dept: SLEEP MEDICINE | Facility: OTHER | Age: 53
End: 2018-06-07

## 2018-07-03 ENCOUNTER — TELEPHONE (OUTPATIENT)
Dept: SLEEP MEDICINE | Facility: OTHER | Age: 53
End: 2018-07-03

## 2018-07-30 ENCOUNTER — TELEPHONE (OUTPATIENT)
Dept: SLEEP MEDICINE | Facility: OTHER | Age: 53
End: 2018-07-30

## 2018-08-28 ENCOUNTER — OFFICE VISIT (OUTPATIENT)
Dept: PRIMARY CARE CLINIC | Facility: CLINIC | Age: 53
End: 2018-08-28
Payer: COMMERCIAL

## 2018-08-28 VITALS
RESPIRATION RATE: 16 BRPM | DIASTOLIC BLOOD PRESSURE: 86 MMHG | OXYGEN SATURATION: 97 % | TEMPERATURE: 98 F | HEIGHT: 70 IN | WEIGHT: 290 LBS | SYSTOLIC BLOOD PRESSURE: 121 MMHG | BODY MASS INDEX: 41.52 KG/M2 | HEART RATE: 77 BPM

## 2018-08-28 DIAGNOSIS — J01.00 ACUTE NON-RECURRENT MAXILLARY SINUSITIS: Primary | ICD-10-CM

## 2018-08-28 DIAGNOSIS — S06.0X1D CONCUSSION WITH LOSS OF CONSCIOUSNESS OF 30 MINUTES OR LESS, SUBSEQUENT ENCOUNTER: ICD-10-CM

## 2018-08-28 PROCEDURE — 96372 THER/PROPH/DIAG INJ SC/IM: CPT | Mod: S$GLB,,, | Performed by: FAMILY MEDICINE

## 2018-08-28 PROCEDURE — 99999 PR PBB SHADOW E&M-EST. PATIENT-LVL III: CPT | Mod: PBBFAC,,, | Performed by: FAMILY MEDICINE

## 2018-08-28 PROCEDURE — 99214 OFFICE O/P EST MOD 30 MIN: CPT | Mod: 25,S$GLB,, | Performed by: FAMILY MEDICINE

## 2018-08-28 PROCEDURE — 3008F BODY MASS INDEX DOCD: CPT | Mod: CPTII,S$GLB,, | Performed by: FAMILY MEDICINE

## 2018-08-28 RX ORDER — BETAMETHASONE SODIUM PHOSPHATE AND BETAMETHASONE ACETATE 3; 3 MG/ML; MG/ML
12 INJECTION, SUSPENSION INTRA-ARTICULAR; INTRALESIONAL; INTRAMUSCULAR; SOFT TISSUE
Status: COMPLETED | OUTPATIENT
Start: 2018-08-28 | End: 2018-08-28

## 2018-08-28 RX ORDER — HYDROCODONE BITARTRATE AND HOMATROPINE METHYLBROMIDE ORAL SOLUTION 5; 1.5 MG/5ML; MG/5ML
5 LIQUID ORAL EVERY 6 HOURS PRN
Qty: 120 ML | Refills: 0 | Status: SHIPPED | OUTPATIENT
Start: 2018-08-28 | End: 2023-02-27

## 2018-08-28 RX ORDER — AZITHROMYCIN 250 MG/1
TABLET, FILM COATED ORAL
Qty: 6 TABLET | Refills: 0 | Status: SHIPPED | OUTPATIENT
Start: 2018-08-28 | End: 2018-09-01

## 2018-08-28 RX ADMIN — BETAMETHASONE SODIUM PHOSPHATE AND BETAMETHASONE ACETATE 12 MG: 3; 3 INJECTION, SUSPENSION INTRA-ARTICULAR; INTRALESIONAL; INTRAMUSCULAR; SOFT TISSUE at 11:08
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
## 2018-08-29 RX ORDER — CITALOPRAM 40 MG/1
TABLET, FILM COATED ORAL
Qty: 30 TABLET | Refills: 5 | Status: SHIPPED | OUTPATIENT
Start: 2018-08-29 | End: 2023-02-27

## 2018-08-30 RX ORDER — LEVOTHYROXINE SODIUM 100 UG/1
100 TABLET ORAL DAILY
Qty: 30 TABLET | Refills: 5 | Status: SHIPPED | OUTPATIENT
Start: 2018-08-30 | End: 2019-03-06 | Stop reason: SDUPTHER

## 2018-10-17 ENCOUNTER — TELEPHONE (OUTPATIENT)
Dept: NEUROLOGY | Facility: CLINIC | Age: 53
End: 2018-10-17
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
## 2018-11-27 RX ORDER — OMEPRAZOLE 40 MG/1
CAPSULE, DELAYED RELEASE ORAL
Qty: 30 CAPSULE | Refills: 5 | Status: SHIPPED | OUTPATIENT
Start: 2018-11-27 | End: 2023-02-27

## 2018-12-13 ENCOUNTER — TELEPHONE (OUTPATIENT)
Dept: NEUROLOGY | Facility: CLINIC | Age: 53
End: 2018-12-13

## 2018-12-28 ENCOUNTER — TELEPHONE (OUTPATIENT)
Dept: PRIMARY CARE CLINIC | Facility: CLINIC | Age: 53
End: 2018-12-28

## 2018-12-28 NOTE — TELEPHONE ENCOUNTER
Patient notified that Dr. Brooks is out of the office and the two providers I have here today are booked. I recommended him to go to the ER. He states understanding

## 2018-12-28 NOTE — TELEPHONE ENCOUNTER
----- Message from Rain Burciaga sent at 12/28/2018  8:28 AM CST -----  Type:  Same Day Appointment Request    Caller is requesting a same day appointment.  Caller declined first available appointment listed below.      Name of Caller:  Patient   When is the first available appointment?  1/2/19  Symptoms:  Injection   Best Call Back Number:  [phone] (home)     Additional Information:   Stating leaving to go out of town tonight and does not want to get sick

## 2019-02-20 DIAGNOSIS — Z12.11 ENCOUNTER FOR FECAL IMMUNOCHEMICAL TEST SCREENING: Primary | ICD-10-CM

## 2019-02-21 ENCOUNTER — PATIENT MESSAGE (OUTPATIENT)
Dept: NEUROLOGY | Facility: CLINIC | Age: 54
End: 2019-02-21

## 2019-03-07 RX ORDER — LEVOTHYROXINE SODIUM 100 UG/1
100 TABLET ORAL DAILY
Qty: 30 TABLET | Refills: 5 | Status: SHIPPED | OUTPATIENT
Start: 2019-03-07 | End: 2020-03-24

## 2020-03-24 RX ORDER — LEVOTHYROXINE SODIUM 100 UG/1
100 TABLET ORAL DAILY
Qty: 30 TABLET | Refills: 2 | Status: SHIPPED | OUTPATIENT
Start: 2020-03-24 | End: 2023-02-27

## 2020-05-14 DIAGNOSIS — Z12.11 COLON CANCER SCREENING: ICD-10-CM

## 2020-10-05 ENCOUNTER — PATIENT MESSAGE (OUTPATIENT)
Dept: ADMINISTRATIVE | Facility: HOSPITAL | Age: 55
End: 2020-10-05

## 2021-01-04 ENCOUNTER — PATIENT MESSAGE (OUTPATIENT)
Dept: ADMINISTRATIVE | Facility: HOSPITAL | Age: 56
End: 2021-01-04

## 2021-04-05 ENCOUNTER — PATIENT MESSAGE (OUTPATIENT)
Dept: ADMINISTRATIVE | Facility: HOSPITAL | Age: 56
End: 2021-04-05

## 2021-04-26 ENCOUNTER — PATIENT MESSAGE (OUTPATIENT)
Dept: RESEARCH | Facility: HOSPITAL | Age: 56
End: 2021-04-26

## 2021-07-06 ENCOUNTER — PATIENT MESSAGE (OUTPATIENT)
Dept: ADMINISTRATIVE | Facility: HOSPITAL | Age: 56
End: 2021-07-06

## 2023-02-27 ENCOUNTER — NURSE TRIAGE (OUTPATIENT)
Dept: ADMINISTRATIVE | Facility: CLINIC | Age: 58
End: 2023-02-27

## 2023-02-27 NOTE — TELEPHONE ENCOUNTER
Patient c/o gout flare-up and left foot pain and swelling. Patient called to request a refill of Indomethacin 50 mg that he received during an ED visit approximately 1(one) year ago.     Care Advice given to Go to an Urgent Care Center Now for evaluation/treatment and to follow-up with PCP. Patient states understanding of care advice.      Reason for Disposition   [1] SEVERE pain (e.g., excruciating, unable to do any normal activities) AND [2] not improved after 2 hours of pain medicine    Additional Information   Negative: Followed a foot injury   Negative: Diabetes mellitus   Negative: Toe pain is main symptom   Negative: Ankle pain is main symptom   Negative: Thigh or calf pain is main symptom   Negative: Entire foot is cool or blue in comparison to other foot   Negative: Purple or black skin on foot or toe   Negative: [1] Red area or streak AND [2] fever   Negative: [1] Swollen foot AND [2] fever   Negative: Patient sounds very sick or weak to the triager    Protocols used: Foot Pain-A-AH

## 2023-02-27 NOTE — TELEPHONE ENCOUNTER
Attempted to return pts' call and adv he would need to re-establish as a new pt and verify insurance, phone answered by a female who adv she would have him return the call

## 2023-09-20 ENCOUNTER — OFFICE VISIT (OUTPATIENT)
Dept: PRIMARY CARE CLINIC | Facility: CLINIC | Age: 58
End: 2023-09-20
Payer: COMMERCIAL

## 2023-09-20 VITALS
HEART RATE: 93 BPM | HEIGHT: 69 IN | DIASTOLIC BLOOD PRESSURE: 82 MMHG | SYSTOLIC BLOOD PRESSURE: 110 MMHG | WEIGHT: 283.19 LBS | OXYGEN SATURATION: 96 % | RESPIRATION RATE: 16 BRPM | TEMPERATURE: 98 F | BODY MASS INDEX: 41.94 KG/M2

## 2023-09-20 DIAGNOSIS — L02.92 BOIL: ICD-10-CM

## 2023-09-20 DIAGNOSIS — L03.90 CELLULITIS, UNSPECIFIED CELLULITIS SITE: ICD-10-CM

## 2023-09-20 DIAGNOSIS — J40 BRONCHITIS: ICD-10-CM

## 2023-09-20 DIAGNOSIS — L42 PITYRIASIS ROSEA: ICD-10-CM

## 2023-09-20 DIAGNOSIS — J32.9 SINUSITIS, UNSPECIFIED CHRONICITY, UNSPECIFIED LOCATION: ICD-10-CM

## 2023-09-20 DIAGNOSIS — E66.01 MORBID OBESITY: Primary | ICD-10-CM

## 2023-09-20 PROCEDURE — 99999 PR PBB SHADOW E&M-EST. PATIENT-LVL V: ICD-10-PCS | Mod: PBBFAC,,, | Performed by: STUDENT IN AN ORGANIZED HEALTH CARE EDUCATION/TRAINING PROGRAM

## 2023-09-20 PROCEDURE — 1160F PR REVIEW ALL MEDS BY PRESCRIBER/CLIN PHARMACIST DOCUMENTED: ICD-10-PCS | Mod: CPTII,S$GLB,, | Performed by: STUDENT IN AN ORGANIZED HEALTH CARE EDUCATION/TRAINING PROGRAM

## 2023-09-20 PROCEDURE — 3008F PR BODY MASS INDEX (BMI) DOCUMENTED: ICD-10-PCS | Mod: CPTII,S$GLB,, | Performed by: STUDENT IN AN ORGANIZED HEALTH CARE EDUCATION/TRAINING PROGRAM

## 2023-09-20 PROCEDURE — 1160F RVW MEDS BY RX/DR IN RCRD: CPT | Mod: CPTII,S$GLB,, | Performed by: STUDENT IN AN ORGANIZED HEALTH CARE EDUCATION/TRAINING PROGRAM

## 2023-09-20 PROCEDURE — 3074F SYST BP LT 130 MM HG: CPT | Mod: CPTII,S$GLB,, | Performed by: STUDENT IN AN ORGANIZED HEALTH CARE EDUCATION/TRAINING PROGRAM

## 2023-09-20 PROCEDURE — 1159F MED LIST DOCD IN RCRD: CPT | Mod: CPTII,S$GLB,, | Performed by: STUDENT IN AN ORGANIZED HEALTH CARE EDUCATION/TRAINING PROGRAM

## 2023-09-20 PROCEDURE — 3044F HG A1C LEVEL LT 7.0%: CPT | Mod: CPTII,S$GLB,, | Performed by: STUDENT IN AN ORGANIZED HEALTH CARE EDUCATION/TRAINING PROGRAM

## 2023-09-20 PROCEDURE — 99214 OFFICE O/P EST MOD 30 MIN: CPT | Mod: S$GLB,,, | Performed by: STUDENT IN AN ORGANIZED HEALTH CARE EDUCATION/TRAINING PROGRAM

## 2023-09-20 PROCEDURE — 3008F BODY MASS INDEX DOCD: CPT | Mod: CPTII,S$GLB,, | Performed by: STUDENT IN AN ORGANIZED HEALTH CARE EDUCATION/TRAINING PROGRAM

## 2023-09-20 PROCEDURE — 3079F DIAST BP 80-89 MM HG: CPT | Mod: CPTII,S$GLB,, | Performed by: STUDENT IN AN ORGANIZED HEALTH CARE EDUCATION/TRAINING PROGRAM

## 2023-09-20 PROCEDURE — 3079F PR MOST RECENT DIASTOLIC BLOOD PRESSURE 80-89 MM HG: ICD-10-PCS | Mod: CPTII,S$GLB,, | Performed by: STUDENT IN AN ORGANIZED HEALTH CARE EDUCATION/TRAINING PROGRAM

## 2023-09-20 PROCEDURE — 1159F PR MEDICATION LIST DOCUMENTED IN MEDICAL RECORD: ICD-10-PCS | Mod: CPTII,S$GLB,, | Performed by: STUDENT IN AN ORGANIZED HEALTH CARE EDUCATION/TRAINING PROGRAM

## 2023-09-20 PROCEDURE — 3074F PR MOST RECENT SYSTOLIC BLOOD PRESSURE < 130 MM HG: ICD-10-PCS | Mod: CPTII,S$GLB,, | Performed by: STUDENT IN AN ORGANIZED HEALTH CARE EDUCATION/TRAINING PROGRAM

## 2023-09-20 PROCEDURE — 99999 PR PBB SHADOW E&M-EST. PATIENT-LVL V: CPT | Mod: PBBFAC,,, | Performed by: STUDENT IN AN ORGANIZED HEALTH CARE EDUCATION/TRAINING PROGRAM

## 2023-09-20 PROCEDURE — 3044F PR MOST RECENT HEMOGLOBIN A1C LEVEL <7.0%: ICD-10-PCS | Mod: CPTII,S$GLB,, | Performed by: STUDENT IN AN ORGANIZED HEALTH CARE EDUCATION/TRAINING PROGRAM

## 2023-09-20 PROCEDURE — 99214 PR OFFICE/OUTPT VISIT, EST, LEVL IV, 30-39 MIN: ICD-10-PCS | Mod: S$GLB,,, | Performed by: STUDENT IN AN ORGANIZED HEALTH CARE EDUCATION/TRAINING PROGRAM

## 2023-09-20 RX ORDER — FLUTICASONE PROPIONATE 50 MCG
1 SPRAY, SUSPENSION (ML) NASAL DAILY
Qty: 11.1 ML | Refills: 0 | Status: SHIPPED | OUTPATIENT
Start: 2023-09-20

## 2023-09-20 RX ORDER — PREDNISONE 10 MG/1
TABLET ORAL
Qty: 7 TABLET | Refills: 0 | Status: SHIPPED | OUTPATIENT
Start: 2023-09-20

## 2023-09-20 RX ORDER — MUPIROCIN 20 MG/G
OINTMENT TOPICAL 2 TIMES DAILY
Qty: 20 G | Refills: 0 | Status: SHIPPED | OUTPATIENT
Start: 2023-09-20

## 2023-09-20 RX ORDER — HYDROXYZINE PAMOATE 50 MG/1
50 CAPSULE ORAL 3 TIMES DAILY
COMMUNITY
Start: 2023-09-18

## 2023-09-20 RX ORDER — TRIAMCINOLONE ACETONIDE 5 MG/G
CREAM TOPICAL
COMMUNITY
Start: 2023-09-18

## 2023-09-20 RX ORDER — SULFAMETHOXAZOLE AND TRIMETHOPRIM 800; 160 MG/1; MG/1
1 TABLET ORAL 2 TIMES DAILY
COMMUNITY
Start: 2023-09-18

## 2023-09-20 NOTE — PROGRESS NOTES
Subjective:           Patient ID: Mahendra Callahan is a 58 y.o. male who presents today with a chief complaint of Rash and Abscess (Lower abdomen)  .    Chief Complaint:   Rash and Abscess (Lower abdomen)      History of Present Illness:    Mahendra Callahan is a 58 y.o. male who presents today with a chief complaint of Rash and Abscess (Lower abdomen)  .      States that Saturday last week his back started itching.   Then developed a rash on the lower back, that then started to move up the back over time.    More recently then developed rash to the right flank, then to unner portions of upper arms.    Then lastly developed a lesion under his abdominal pannus.    Saw urgent care on Monday.    Was started on a Kenalog cream for 7 days for arm rash and Bactrim BID for 7 days (14 pills).  Also was given Hydroxyzine 25mg TID PRN; 30 pills, for itch to skin.  Started the Bactrim on Tuesday morning.    Abdominal Panus - has some drainage on Tuesday AM.  States has not drained otherwise.      URI:   - states woke this AM congested.    - has SOB with breathing.     Hypothyroid:  was dx of low thyroid.  Was also getting frequent gout flares.  States he lost weight, and most of things calmed down.  So is now not taking anything.     Review of Systems   Constitutional: Negative.  Negative for fatigue and fever.   HENT:  Positive for congestion and sinus pain. Negative for sore throat.    Eyes: Negative.    Respiratory:  Positive for cough and shortness of breath.    Cardiovascular: Negative.  Negative for chest pain, palpitations and leg swelling.   Gastrointestinal: Negative.  Negative for abdominal distention, constipation, diarrhea, nausea and vomiting.   Endocrine: Negative.    Genitourinary: Negative.  Negative for difficulty urinating, frequency and urgency.   Musculoskeletal: Negative.    Skin:  Positive for color change and rash.   Allergic/Immunologic: Negative for food allergies.   Neurological:  Negative for headaches.  "  Psychiatric/Behavioral: Negative.  The patient is not nervous/anxious.            Objective:        Vitals:    09/20/23 0934   BP: 110/82   BP Location: Right arm   Patient Position: Sitting   BP Method: Medium (Manual)   Pulse: 93   Resp: 16   Temp: 98.4 °F (36.9 °C)   TempSrc: Temporal   SpO2: 96%   Weight: 128.4 kg (283 lb 2.9 oz)   Height: 5' 9" (1.753 m)       Body mass index is 41.82 kg/m².                          Physical Exam  Constitutional:       Appearance: Normal appearance. He is obese. He is ill-appearing. He is not toxic-appearing.      Comments: As per BMI.   HENT:      Head: Normocephalic and atraumatic.      Right Ear: External ear normal.      Left Ear: External ear normal.      Nose: No congestion.      Mouth/Throat:      Mouth: Mucous membranes are moist.      Pharynx: Oropharynx is clear.   Eyes:      Extraocular Movements: Extraocular movements intact.      Conjunctiva/sclera: Conjunctivae normal.   Cardiovascular:      Rate and Rhythm: Normal rate and regular rhythm.      Heart sounds: No murmur heard.  Pulmonary:      Effort: Pulmonary effort is normal. No respiratory distress.      Breath sounds: Wheezing present.   Abdominal:      General: Bowel sounds are normal.      Palpations: Abdomen is soft.   Musculoskeletal:         General: No swelling.      Cervical back: Normal range of motion.      Right lower leg: No edema.      Left lower leg: No edema.   Skin:     General: Skin is warm.      Capillary Refill: Capillary refill takes less than 2 seconds.      Coloration: Skin is not jaundiced.      Findings: Erythema, lesion and rash present.   Neurological:      General: No focal deficit present.      Mental Status: He is alert and oriented to person, place, and time.      Motor: No weakness.   Psychiatric:         Mood and Affect: Mood normal.             Lab Results   Component Value Date     03/10/2023    K 4.4 03/10/2023     03/10/2023    CO2 26 03/10/2023    BUN 16 " 03/10/2023    CREATININE 1.07 03/10/2023    ANIONGAP 14 05/21/2022     Lab Results   Component Value Date    HGBA1C 5.6 03/10/2023     Lab Results   Component Value Date    BNP 73 08/27/2018    BNP 22 05/07/2018       Lab Results   Component Value Date    WBC 6.2 03/10/2023    HGB 16.2 03/10/2023    HCT 49.5 03/10/2023     03/10/2023    GRAN 9.4 (H) 05/21/2022    GRAN 74.7 (H) 05/21/2022     Lab Results   Component Value Date    CHOL 161 03/10/2023    HDL 56 03/10/2023    LDLCALC 91 03/10/2023    TRIG 54 03/10/2023          Current Outpatient Medications:     allopurinoL (ZYLOPRIM) 300 MG tablet, Take 1 tablet (300 mg total) by mouth once daily., Disp: 90 tablet, Rfl: 0    chlorthalidone (HYGROTEN) 25 MG Tab, Take 1 tablet (25 mg total) by mouth once daily., Disp: 90 tablet, Rfl: 0    colchicine (COLCRYS) 0.6 mg tablet, TAKE 1 TABLET BY MOUTH TWICE A DAY, Disp: 180 tablet, Rfl: 0    hydrOXYzine pamoate (VISTARIL) 50 MG Cap, Take 50 mg by mouth 3 (three) times daily., Disp: , Rfl:     indomethacin (INDOCIN) 50 MG capsule, Take 1 capsule (50 mg total) by mouth 3 (three) times daily with meals., Disp: 30 capsule, Rfl: 0    sulfamethoxazole-trimethoprim 800-160mg (BACTRIM DS) 800-160 mg Tab, Take 1 tablet by mouth 2 (two) times daily., Disp: , Rfl:     triamcinolone acetonide 0.5% (KENALOG) 0.5 % Crea, Apply topically., Disp: , Rfl:     fluticasone propionate (FLONASE) 50 mcg/actuation nasal spray, 1 spray (50 mcg total) by Each Nostril route once daily., Disp: 11.1 mL, Rfl: 0    mupirocin (BACTROBAN) 2 % ointment, Apply topically 2 (two) times daily., Disp: 20 g, Rfl: 0    predniSONE (DELTASONE) 10 MG tablet, 2 tabs (20mg) for 2 days, then 1 tab (10mg) for 3 days., Disp: 7 tablet, Rfl: 0     Outpatient Encounter Medications as of 9/20/2023   Medication Sig Dispense Refill    allopurinoL (ZYLOPRIM) 300 MG tablet Take 1 tablet (300 mg total) by mouth once daily. 90 tablet 0    chlorthalidone (HYGROTEN) 25 MG Tab  Take 1 tablet (25 mg total) by mouth once daily. 90 tablet 0    colchicine (COLCRYS) 0.6 mg tablet TAKE 1 TABLET BY MOUTH TWICE A  tablet 0    hydrOXYzine pamoate (VISTARIL) 50 MG Cap Take 50 mg by mouth 3 (three) times daily.      indomethacin (INDOCIN) 50 MG capsule Take 1 capsule (50 mg total) by mouth 3 (three) times daily with meals. 30 capsule 0    sulfamethoxazole-trimethoprim 800-160mg (BACTRIM DS) 800-160 mg Tab Take 1 tablet by mouth 2 (two) times daily.      triamcinolone acetonide 0.5% (KENALOG) 0.5 % Crea Apply topically.      fluticasone propionate (FLONASE) 50 mcg/actuation nasal spray 1 spray (50 mcg total) by Each Nostril route once daily. 11.1 mL 0    mupirocin (BACTROBAN) 2 % ointment Apply topically 2 (two) times daily. 20 g 0    predniSONE (DELTASONE) 10 MG tablet 2 tabs (20mg) for 2 days, then 1 tab (10mg) for 3 days. 7 tablet 0    [DISCONTINUED] doxycycline (VIBRAMYCIN) 100 MG Cap Take 1 capsule (100 mg total) by mouth every 12 (twelve) hours. 20 capsule 0     No facility-administered encounter medications on file as of 9/20/2023.          Assessment:       1. Morbid obesity    2. Bronchitis    3. Sinusitis, unspecified chronicity, unspecified location    4. Cellulitis, unspecified cellulitis site    5. Pityriasis rosea    6. Boil           Plan:       Morbid obesity    Bronchitis  -     predniSONE (DELTASONE) 10 MG tablet; 2 tabs (20mg) for 2 days, then 1 tab (10mg) for 3 days.  Dispense: 7 tablet; Refill: 0    Sinusitis, unspecified chronicity, unspecified location  -     predniSONE (DELTASONE) 10 MG tablet; 2 tabs (20mg) for 2 days, then 1 tab (10mg) for 3 days.  Dispense: 7 tablet; Refill: 0  -     fluticasone propionate (FLONASE) 50 mcg/actuation nasal spray; 1 spray (50 mcg total) by Each Nostril route once daily.  Dispense: 11.1 mL; Refill: 0    Cellulitis, unspecified cellulitis site  -     mupirocin (BACTROBAN) 2 % ointment; Apply topically 2 (two) times daily.  Dispense: 20 g;  Refill: 0  -     Ambulatory referral/consult to Dermatology; Future; Expected date: 09/27/2023    Pityriasis rosea  -     Ambulatory referral/consult to Dermatology; Future; Expected date: 09/27/2023    Boil  -     Ambulatory referral/consult to Dermatology; Future; Expected date: 09/27/2023               Cellulitis/skin abscess:   - 58-year-old male presents with diffuse body rash, and a focal skin infection under his abdominal pannus.   - patient was seen at urgent care on Monday, started on Bactrim DS twice a day for 1 week, started this medication yesterday morning.  Additionally was given Celestone shot to help reduce inflammation and body rash.   - on exam patient does appear to have infection to the lower right side of abdomen, other lesions appear inflammatory but not infected.   - advised patient to use mupirocin 2% ointment twice a day to infected area of skin, while continuing with oral antibiotic, Bactrim DS, twice a day for 7 days.   - does not appear to need drainage at this time.  Would recommend having his PCP, Dr. Pena, or Dermatology, Dr. Licona, look at this area at next appointment.    Pityriasis rosea:   - patient has what appears to be herald patch to bilateral lower back, with multiple inflammatory lesions to right flank, upper back and bilateral in her arms.   - advised that the steroid provided by urgent Care, as well as the oral steroid we are providing today are possible modes of treatment to help reduce the inflammation from this condition, but should be discussed with dermatology at an appointment within the next 2 or 3 weeks.    URI:   - patient woke this morning with congestion.  During exam does have obvious sinus congestion with some drainage.  Is having some dyspnea with vocalization.  Does not appear acutely infected with any suggestion of pneumonia.  Do not feel needs additional antibiotics beyond the current Bactrim.   - would advise use Flonase to help open nares, could use  "nasal saline prior to in his during Flonase if having thick drainage.   - additionally would consider use of antihistamine to help reduce sinus congestion.  Keep well hydrated.  Get good sleep.   - giving oral steroid to help reduce inflammation to arms and rash, but this will also help to open the sinuses and reduce the congestion.    Skin tags:   - patient has numerous skin tags to bilateral axilla, flanks and neck.  Advised can speak to Dermatology about having these removed.  Not infected or inflammatory at this time.        Weight Loss:   - Body mass index is 41.82 kg/m².   - Normal weight is BMI 18-24.9.     - Overweight: 25-29.9  - Class 1 Obesity: 30-34.9  - Class 2 Obesity: 35-39.9  - Class 3 Obesity: 40+  - A BMI under 18 also shows diminished health outcomes.   - best health outcomes have been seen at a BMI of 22.    - excess weigh affects many body systems, including: cardiac, respiratory, GI, endocrine, musculoskeletal, dermatologic, reproductive, mental health and more.   - recommended moderate weight change, 1-2lbs per weeks.   - focus on eating a healthy sustainable diet.  Use food diary for at least a couple weeks to better understand what your diet.   - consider evangelista such as "Lose It" or "Noom".   - avoid empty calories that you may use daily from items such as like soda, sweet tea, sugary coffee, ice cream, cake/pie, cookies/brownies/crackers or candy.  An occasional piece of birthday cake is not the cause of obesity, but a daily Frappaccino could be to blame.    - "Low Fat" on a box or bag should be eyed with caution, this fat it typically replaced with forms of sugar/carbs and the resultant product may be less healthy than other products.   - when possible eating whole foods is almost always preferable.  A diet of salads, green beans, broccoli, cauliflower, cucumbers, sweet potatoes, peppers, olives/avocados, tomatoes, beats, berries, eggs, meat/fish, shrimp/crawfish with some limited fruit " (apples/oranges/bananas/grapes) and even more limited grains/starches (pasta/rice/bread/potatoes/cereal) will serve you much better in the long term than eating a bunch of diet bars, shakes or powders.     - Exercise has many benefits (heart health, improved mood/energy, higher self esteem, less depression, greater strength/flexibility, better sleep, less stress/anxiety, improved immune system, stronger bones, improved cognition, fewer colds/asthma exacerbations), it also does help lose weight.  But weight loss from exercise is much less impactful than when a change in diet can achieve.  Exercise is highly encouraged, but diet change should be the primary tool used to lose weight.

## 2023-09-20 NOTE — PATIENT INSTRUCTIONS
Cellulitis/skin abscess:   - 58-year-old male presents with diffuse body rash, and a focal skin infection under his abdominal pannus.   - patient was seen at urgent care on Monday, started on Bactrim DS twice a day for 1 week, started this medication yesterday morning.  Additionally was given Celestone shot to help reduce inflammation and body rash.   - on exam patient does appear to have infection to the lower right side of abdomen, other lesions appear inflammatory but not infected.   - advised patient to use mupirocin 2% ointment twice a day to infected area of skin, while continuing with oral antibiotic, Bactrim DS, twice a day for 7 days.   - does not appear to need drainage at this time.  Would recommend having his PCP, Dr. Pena, or Dermatology, Dr. Licona, look at this area at next appointment.    Pityriasis rosea:   - patient has what appears to be herald patch to bilateral lower back, with multiple inflammatory lesions to right flank, upper back and bilateral in her arms.   - advised that the steroid provided by urgent Care, as well as the oral steroid we are providing today are possible modes of treatment to help reduce the inflammation from this condition, but should be discussed with dermatology at an appointment within the next 2 or 3 weeks.    URI:   - patient woke this morning with congestion.  During exam does have obvious sinus congestion with some drainage.  Is having some dyspnea with vocalization.  Does not appear acutely infected with any suggestion of pneumonia.  Do not feel needs additional antibiotics beyond the current Bactrim.   - would advise use Flonase to help open nares, could use nasal saline prior to in his during Flonase if having thick drainage.   - additionally would consider use of antihistamine to help reduce sinus congestion.  Keep well hydrated.  Get good sleep.   - giving oral steroid to help reduce inflammation to arms and rash, but this will also help to open the  "sinuses and reduce the congestion.    Skin tags:   - patient has numerous skin tags to bilateral axilla, flanks and neck.  Advised can speak to Dermatology about having these removed.  Not infected or inflammatory at this time.        Weight Loss:   - Body mass index is 41.82 kg/m².   - Normal weight is BMI 18-24.9.     - Overweight: 25-29.9  - Class 1 Obesity: 30-34.9  - Class 2 Obesity: 35-39.9  - Class 3 Obesity: 40+  - A BMI under 18 also shows diminished health outcomes.   - best health outcomes have been seen at a BMI of 22.    - excess weigh affects many body systems, including: cardiac, respiratory, GI, endocrine, musculoskeletal, dermatologic, reproductive, mental health and more.   - recommended moderate weight change, 1-2lbs per weeks.   - focus on eating a healthy sustainable diet.  Use food diary for at least a couple weeks to better understand what your diet.   - consider evangelista such as "Lose It" or "Noom".   - avoid empty calories that you may use daily from items such as like soda, sweet tea, sugary coffee, ice cream, cake/pie, cookies/brownies/crackers or candy.  An occasional piece of birthday cake is not the cause of obesity, but a daily Frappaccino could be to blame.    - "Low Fat" on a box or bag should be eyed with caution, this fat it typically replaced with forms of sugar/carbs and the resultant product may be less healthy than other products.   - when possible eating whole foods is almost always preferable.  A diet of salads, green beans, broccoli, cauliflower, cucumbers, sweet potatoes, peppers, olives/avocados, tomatoes, beats, berries, eggs, meat/fish, shrimp/crawfish with some limited fruit (apples/oranges/bananas/grapes) and even more limited grains/starches (pasta/rice/bread/potatoes/cereal) will serve you much better in the long term than eating a bunch of diet bars, shakes or powders.     - Exercise has many benefits (heart health, improved mood/energy, higher self esteem, less " depression, greater strength/flexibility, better sleep, less stress/anxiety, improved immune system, stronger bones, improved cognition, fewer colds/asthma exacerbations), it also does help lose weight.  But weight loss from exercise is much less impactful than when a change in diet can achieve.  Exercise is highly encouraged, but diet change should be the primary tool used to lose weight.

## 2023-09-21 ENCOUNTER — PATIENT MESSAGE (OUTPATIENT)
Dept: ADMINISTRATIVE | Facility: HOSPITAL | Age: 58
End: 2023-09-21
Payer: COMMERCIAL

## 2023-10-18 ENCOUNTER — PATIENT MESSAGE (OUTPATIENT)
Dept: CARDIOLOGY | Facility: CLINIC | Age: 58
End: 2023-10-18
Payer: COMMERCIAL

## 2023-12-06 ENCOUNTER — PATIENT OUTREACH (OUTPATIENT)
Dept: ADMINISTRATIVE | Facility: HOSPITAL | Age: 58
End: 2023-12-06
Payer: COMMERCIAL

## 2023-12-06 NOTE — PROGRESS NOTES
Health Maintenance Due   Topic Date Due    Hepatitis C Screening  Never done    COVID-19 Vaccine (1) Never done    HIV Screening  Never done    Colorectal Cancer Screening  Never done    Shingles Vaccine (1 of 2) Never done    RSV Vaccine (Age 60+ and Pregnant patients) (1 - 1-dose 60+ series) Never done    Influenza Vaccine (1) Never done        Chart review done.   HM updated.   Immunizations reviewed & updated.   Care Everywhere updated.

## 2024-01-18 ENCOUNTER — PATIENT OUTREACH (OUTPATIENT)
Dept: ADMINISTRATIVE | Facility: HOSPITAL | Age: 59
End: 2024-01-18
Payer: COMMERCIAL

## 2024-01-18 NOTE — PROGRESS NOTES
Health Maintenance Due   Topic Date Due    Hepatitis C Screening  Never done    COVID-19 Vaccine (1) Never done    HIV Screening  Never done    High Dose Statin  Never done    Colorectal Cancer Screening  Never done    Shingles Vaccine (1 of 2) Never done    RSV Vaccine (Age 60+ and Pregnant patients) (1 - 1-dose 60+ series) Never done    Influenza Vaccine (1) Never done        Chart review done.   HM updated.   Immunizations reviewed & updated.   Care Everywhere updated.

## 2024-02-06 DIAGNOSIS — Z12.11 COLON CANCER SCREENING: ICD-10-CM

## 2024-07-17 ENCOUNTER — NURSE TRIAGE (OUTPATIENT)
Dept: ADMINISTRATIVE | Facility: CLINIC | Age: 59
End: 2024-07-17
Payer: COMMERCIAL

## 2024-07-17 NOTE — TELEPHONE ENCOUNTER
Patient states he fell outside on yesterday, 7/16/24, and hit the ground injuring his right shoulder. Patient states c/o pain at top of his right shoulder and neck and rates pain 8/10.     Care Advice given per Shoulder Injury - Adult Guideline. Patient advised to visit an Urgent Care Center Today for evaluation/treatment. Patient also advised to contact the [facility] on Call Triage Service for any worsening symptoms. Patient states understanding of care advice.     Reason for Disposition   SEVERE pain (e.g., excruciating)    Additional Information   Negative: Major bleeding (actively dripping or spurting) that can't be stopped   Negative: Amputation or bone sticking through the skin   Negative: Bullet, stabbed by knife or other serious penetrating wound   Negative: Serious injury with multiple fractures (broken bones)   Negative: Sounds like a life-threatening emergency to the triager   Negative: Wound looks infected   Negative: Looks like a broken bone or dislocated joint (crooked or deformed)   Negative: Can't move injured shoulder at all   Negative: Collar bone is painful or tender to touch   Negative: Skin is split open or gaping (length > 1/2 inch or 12 mm)   Negative: Bleeding won't stop after 10 minutes of direct pressure (using correct technique)   Negative: Dirt in the wound and not removed after 15 minutes of scrubbing   Negative: Sounds like a serious injury to the triager    Protocols used: Shoulder Injury-A-[place]

## 2024-09-19 ENCOUNTER — PATIENT MESSAGE (OUTPATIENT)
Dept: PRIMARY CARE CLINIC | Facility: CLINIC | Age: 59
End: 2024-09-19
Payer: COMMERCIAL

## 2024-12-12 ENCOUNTER — TELEPHONE (OUTPATIENT)
Dept: PRIMARY CARE CLINIC | Facility: CLINIC | Age: 59
End: 2024-12-12

## 2024-12-12 NOTE — TELEPHONE ENCOUNTER
----- Message from Sergio sent at 12/12/2024  4:46 PM CST -----  Regarding: Medical Advice  Contact: Pt [phone]  [email or web address]     Patient is calling for Medical Advice regarding: Patient says he is experiencing cold like symptoms. He wanted to know if he could be seen tomorrow.    How long has patient had these symptoms: A week    Pharmacy name and phone#:    [id number] - [place] - [address]  [address]  [place] [address]  Phone: [phone] Fax: [phone]    Patient wants a call back or thru [facility]: Call    Comments:    Please advise patient replies from provider may take up to 48 hours.

## 2025-02-06 ENCOUNTER — NURSE TRIAGE (OUTPATIENT)
Dept: ADMINISTRATIVE | Facility: CLINIC | Age: 60
End: 2025-02-06
Payer: COMMERCIAL

## 2025-02-06 ENCOUNTER — OFFICE VISIT (OUTPATIENT)
Dept: PRIMARY CARE CLINIC | Facility: CLINIC | Age: 60
End: 2025-02-06

## 2025-02-06 VITALS
DIASTOLIC BLOOD PRESSURE: 84 MMHG | RESPIRATION RATE: 16 BRPM | OXYGEN SATURATION: 98 % | SYSTOLIC BLOOD PRESSURE: 138 MMHG | BODY MASS INDEX: 43.83 KG/M2 | HEIGHT: 69 IN | HEART RATE: 94 BPM | TEMPERATURE: 98 F | WEIGHT: 295.94 LBS

## 2025-02-06 DIAGNOSIS — Z00.00 ANNUAL PHYSICAL EXAM: ICD-10-CM

## 2025-02-06 DIAGNOSIS — L72.9 CYST OF SKIN: ICD-10-CM

## 2025-02-06 DIAGNOSIS — J40 BRONCHITIS: ICD-10-CM

## 2025-02-06 DIAGNOSIS — J06.9 UPPER RESPIRATORY TRACT INFECTION, UNSPECIFIED TYPE: Primary | ICD-10-CM

## 2025-02-06 DIAGNOSIS — J32.0 CHRONIC MAXILLARY SINUSITIS: ICD-10-CM

## 2025-02-06 DIAGNOSIS — Z11.4 ENCOUNTER FOR SCREENING FOR HIV: ICD-10-CM

## 2025-02-06 DIAGNOSIS — Z11.59 NEED FOR HEPATITIS C SCREENING TEST: ICD-10-CM

## 2025-02-06 DIAGNOSIS — E66.01 MORBID OBESITY: ICD-10-CM

## 2025-02-06 LAB
CTP QC/QA: YES
POC MOLECULAR INFLUENZA A AGN: NEGATIVE
POC MOLECULAR INFLUENZA B AGN: NEGATIVE

## 2025-02-06 PROCEDURE — 99999 PR PBB SHADOW E&M-EST. PATIENT-LVL V: CPT | Mod: PBBFAC,,, | Performed by: STUDENT IN AN ORGANIZED HEALTH CARE EDUCATION/TRAINING PROGRAM

## 2025-02-06 RX ORDER — METHYLPREDNISOLONE 4 MG/1
TABLET ORAL
Qty: 21 TABLET | Refills: 0 | Status: SHIPPED | OUTPATIENT
Start: 2025-02-06

## 2025-02-06 RX ORDER — DEXCHLORPHENIRAMINE MALEATE, DEXTROMETHORPHAN HBR, PHENYLEPHRINE HCL 1; 10; 5 MG/5ML; MG/5ML; MG/5ML
SYRUP ORAL
COMMUNITY

## 2025-02-06 RX ORDER — AZITHROMYCIN 500 MG/1
500 TABLET, FILM COATED ORAL DAILY
Qty: 7 TABLET | Refills: 0 | Status: SHIPPED | OUTPATIENT
Start: 2025-02-06 | End: 2025-02-13

## 2025-02-06 RX ORDER — BENZONATATE 100 MG/1
100 CAPSULE ORAL 3 TIMES DAILY PRN
Qty: 40 CAPSULE | Refills: 0 | Status: SHIPPED | OUTPATIENT
Start: 2025-02-06

## 2025-02-06 NOTE — TELEPHONE ENCOUNTER
Pt calling and wanting to make appt and was exposed to the flu. Pt said that he has nasal congestion, cough fever headache and body aches he was exposed to the flu and now wants to be seen. Pt triaged and care advice to see MD today and wife had made an appt on the portal. Pt to call back if any other questions or concerns or worsening.                         Reason for Disposition   Patient wants to be seen    Additional Information   Negative: SEVERE difficulty breathing (e.g., struggling for each breath, speaks in single words)   Negative: Bluish (or gray) lips or face   Negative: Shock suspected (e.g., cold/pale/clammy skin, too weak to stand, low BP, rapid pulse)   Negative: Sounds like a life-threatening emergency to the triager   Negative: Headache and stiff neck (can't touch chin to chest)   Negative: Chest pain  (Exception: MILD central chest pain, present only when coughing.)   Negative: Difficulty breathing that is not severe and not relieved by cleaning out the nose   Negative: Patient sounds very sick or weak to the triager   Negative: Fever > 104 F (40 C)   Negative: Fever > 101 F (38.3 C) and over 60 years of age   Negative: Fever > 100 F (37.8 C) and diabetes mellitus or weak immune system (e.g., HIV positive, cancer chemo, splenectomy, organ transplant, chronic steroids)   Negative: Fever > 100 F (37.8 C) and bedridden (e.g., CVA, chronic illness, recovering from surgery)   Negative: Using nasal washes and pain medicine > 24 hours and sinus pain (lower forehead, cheekbone, or eye) persists   Negative: Fever present > 3 days (72 hours)   Negative: Fever returns after gone for over 24 hours and symptoms worse (or not improved)   Negative: Earache    Protocols used: Influenza (Flu) - Seasonal-A-[place]

## 2025-02-06 NOTE — PROGRESS NOTES
Assessment:       1. Upper respiratory tract infection, unspecified type    2. Chronic maxillary sinusitis    3. Bronchitis    4. Morbid obesity    5. Cyst of skin    6. Need for hepatitis C screening test    7. Encounter for screening for HIV    8. Annual physical exam           Plan:     Assessment & Plan    E66.01 Morbid obesity  J06.9 Upper respiratory tract infection, unspecified type  J32.0 Chronic maxillary sinusitis  J40 Bronchitis  L72.9 Cyst of skin  Z11.59 Need for hepatitis C screening test  Z11.4 Encounter for screening for HIV  Z00.00 Annual physical exam    IMPRESSION:  - Considering flu based on symptoms and recent exposure  - Evaluating for potential bacterial infection given persistent symptoms since previous doxycycline treatment  - Assessing hydration status and ruling out kidney involvement  - Noted borderline blood pressure from previous visit  - Observed ear fullness, potentially requiring annual flushing  - Identified mass on upper mid chest wall, likely sebaceous cyst requiring surgical removal    PLAN SUMMARY:  - Ordered flu and strep swabs  - Offered referral for sebaceous cyst removal to Dr. Licona or Dr. Garcia  - Advised to continue hydrating well  - If flu test positive, initiate Tamiflu  - If flu test negative, consider prescribing Augmentin instead of doxycycline    J06.9 UPPER RESPIRATORY TRACT INFECTION, UNSPECIFIED TYPE:  - Ordered flu and strep swabs (if necessary after throat exam).  - Explained Tamiflu's mechanism of action in shortening influenza duration and potentially limiting symptoms.  - If flu test is positive, will initiate Tamiflu; if negative, will consider prescribing Augmentin as an alternative to previously prescribed doxycycline.  - Advised patient to continue hydrating well.    L72.9 CYST OF SKIN:  - Discussed sebaceous cyst removal procedure, emphasizing the necessity for complete excision including the capsule.  - Offered to submit a referral for the  procedure to either Dr. Licona or Dr. Garcia.             Upper respiratory tract infection, unspecified type  -     POCT Influenza A/B Molecular  -     CBC Auto Differential; Future; Expected date: 02/06/2025  -     Comprehensive Metabolic Panel; Future; Expected date: 02/06/2025  -     Lipid Panel; Future; Expected date: 02/06/2025  -     Hemoglobin A1C; Future; Expected date: 02/06/2025  -     TSH; Future; Expected date: 02/06/2025  -     azithromycin (ZITHROMAX) 500 MG tablet; Take 1 tablet (500 mg total) by mouth once daily. for 7 days  Dispense: 7 tablet; Refill: 0  -     methylPREDNISolone (MEDROL DOSEPACK) 4 mg tablet; use as directed  Dispense: 21 tablet; Refill: 0  -     benzonatate (TESSALON) 100 MG capsule; Take 1 capsule (100 mg total) by mouth 3 (three) times daily as needed for Cough.  Dispense: 40 capsule; Refill: 0    Chronic maxillary sinusitis  -     CBC Auto Differential; Future; Expected date: 02/06/2025  -     Comprehensive Metabolic Panel; Future; Expected date: 02/06/2025  -     Lipid Panel; Future; Expected date: 02/06/2025  -     Hemoglobin A1C; Future; Expected date: 02/06/2025  -     TSH; Future; Expected date: 02/06/2025  -     azithromycin (ZITHROMAX) 500 MG tablet; Take 1 tablet (500 mg total) by mouth once daily. for 7 days  Dispense: 7 tablet; Refill: 0  -     methylPREDNISolone (MEDROL DOSEPACK) 4 mg tablet; use as directed  Dispense: 21 tablet; Refill: 0  -     benzonatate (TESSALON) 100 MG capsule; Take 1 capsule (100 mg total) by mouth 3 (three) times daily as needed for Cough.  Dispense: 40 capsule; Refill: 0    Bronchitis  -     CBC Auto Differential; Future; Expected date: 02/06/2025  -     Comprehensive Metabolic Panel; Future; Expected date: 02/06/2025  -     Lipid Panel; Future; Expected date: 02/06/2025  -     Hemoglobin A1C; Future; Expected date: 02/06/2025  -     TSH; Future; Expected date: 02/06/2025  -     azithromycin (ZITHROMAX) 500 MG tablet; Take 1 tablet  (500 mg total) by mouth once daily. for 7 days  Dispense: 7 tablet; Refill: 0  -     methylPREDNISolone (MEDROL DOSEPACK) 4 mg tablet; use as directed  Dispense: 21 tablet; Refill: 0  -     benzonatate (TESSALON) 100 MG capsule; Take 1 capsule (100 mg total) by mouth 3 (three) times daily as needed for Cough.  Dispense: 40 capsule; Refill: 0    Morbid obesity  -     CBC Auto Differential; Future; Expected date: 02/06/2025  -     Comprehensive Metabolic Panel; Future; Expected date: 02/06/2025  -     Lipid Panel; Future; Expected date: 02/06/2025  -     Hemoglobin A1C; Future; Expected date: 02/06/2025  -     TSH; Future; Expected date: 02/06/2025    Cyst of skin  -     Ambulatory referral/consult to Dermatology; Future; Expected date: 02/13/2025  -     Ambulatory referral/consult to Dermatology; Future; Expected date: 02/13/2025    Need for hepatitis C screening test  -     Hepatitis C Antibody; Future; Expected date: 02/06/2025    Encounter for screening for HIV  -     HIV 1/2 Ag/Ab (4th Gen); Future; Expected date: 02/06/2025    Annual physical exam  -     CBC Auto Differential; Future; Expected date: 02/06/2025  -     Comprehensive Metabolic Panel; Future; Expected date: 02/06/2025  -     Lipid Panel; Future; Expected date: 02/06/2025  -     Hemoglobin A1C; Future; Expected date: 02/06/2025  -     TSH; Future; Expected date: 02/06/2025                This note was generated with the assistance of ambient listening technology. Verbal consent was obtained by the patient and accompanying visitor(s) for the recording of patient appointment to facilitate this note. I attest to having reviewed and edited the generated note for accuracy, though some syntax or spelling errors may persist. Please contact the author of this note for any clarification.      Subjective:           Patient ID: Mahendra Callahan   Age:  59 y.o.  Sex: male     Chief Complaint:   Cough (Productive (Green mucus)), Generalized Body Aches, Sore Throat,  and Nasal Congestion      History of Present Illness:    Mahendra Callahan is a 59 y.o. male who presents today with a chief complaint of Cough (Productive (Green mucus)), Generalized Body Aches, Sore Throat, and Nasal Congestion  .    Has been sick for 3 days with cough, phlegm, body aches, sore throat and congestion.    Was around grandson who had flu over weekend, did not know he was sick until Monday.    Had gone to [facility] 2 months ago and got Rx for WesTussin DM to help with cough and sinus.    Has been up all night having trouble sleeping.     History of Present Illness    CHIEF COMPLAINT:  Mahendra presents today with productive cough, body aches, sore throat, and congestion for three days.    HISTORY OF PRESENT ILLNESS:  He reports a productive cough with green mucus and difficulty sleeping due to having to sit up all night. He had exposure to flu from his grandson approximately one week ago. His current symptoms are being managed with Robitussin and Sudafed. He was seen at [facility] urgent care two months ago and was prescribed Polytucin, DM, doxycycline, naproxen, and a medrol Dosepak. He reports symptoms never fully resolved since that visit.    MEDICAL HISTORY:  He has history of right earwax buildup requiring annual ear flushing for management.      ROS:  ENT: +nasal congestion, +sore throat  Respiratory: +cough  Psychiatric: +sleep difficulty           Review of Systems   Constitutional:  Positive for fatigue. Negative for fever.   HENT:  Positive for congestion, postnasal drip, rhinorrhea, sinus pressure, sinus pain and sore throat.    Eyes: Negative.  Negative for visual disturbance.   Respiratory:  Positive for cough, chest tightness and shortness of breath.    Cardiovascular: Negative.  Negative for chest pain, palpitations and leg swelling.   Gastrointestinal:  Negative for constipation, diarrhea, nausea and vomiting.   Endocrine: Negative.    Genitourinary:  Positive for frequency. Negative for difficulty  "urinating and urgency.   Musculoskeletal: Negative.  Negative for arthralgias and back pain.   Skin: Negative.    Allergic/Immunologic: Negative for food allergies.   Neurological:  Positive for headaches. Negative for weakness.   Psychiatric/Behavioral: Negative.  Negative for decreased concentration and sleep disturbance.            Objective:        Vitals:    02/06/25 1301 02/06/25 1327   BP: (!) 140/84 138/84   BP Location: Left arm Right arm   Patient Position: Sitting Sitting   Pulse: 94    Resp: 16    Temp: 98.4 °F (36.9 °C)    TempSrc: Oral    SpO2: 98%    Weight: 134.2 kg (295 lb 15.5 oz)    Height: 5' 9" (1.753 m)        Body mass index is 43.71 kg/m².      Physical Exam  Constitutional:       Appearance: Normal appearance. He is not toxic-appearing.      Comments: As per BMI.   HENT:      Head: Normocephalic and atraumatic.      Right Ear: External ear normal.      Left Ear: External ear normal.      Nose: No congestion.      Mouth/Throat:      Mouth: Mucous membranes are moist.      Pharynx: Oropharynx is clear.   Eyes:      Extraocular Movements: Extraocular movements intact.      Conjunctiva/sclera: Conjunctivae normal.   Cardiovascular:      Rate and Rhythm: Normal rate and regular rhythm.      Heart sounds: No murmur heard.  Pulmonary:      Effort: Pulmonary effort is normal. No respiratory distress.      Breath sounds: No wheezing.      Comments: Coughing frequently.  Chest:          Comments: Round raised cystic lesion to upper chest wall.    Abdominal:      General: Bowel sounds are normal.      Palpations: Abdomen is soft.   Musculoskeletal:         General: No swelling.      Cervical back: Normal range of motion.   Skin:     General: Skin is warm.      Capillary Refill: Capillary refill takes less than 2 seconds.      Coloration: Skin is not jaundiced.   Neurological:      General: No focal deficit present.      Mental Status: He is alert and oriented to person, place, and time.      Motor: No " weakness.   Psychiatric:         Mood and Affect: Mood normal.         Physical Exam    Ears: Cerumen impaction in ear.               Past Medical History:   Diagnosis Date    GERD (gastroesophageal reflux disease)     Hypogonadism, male     Hypothyroidism        Lab Results   Component Value Date     03/10/2023    K 4.4 03/10/2023     03/10/2023    CO2 26 03/10/2023    BUN 16 03/10/2023    CREATININE 1.07 03/10/2023    ANIONGAP 14 05/21/2022     Lab Results   Component Value Date    HGBA1C 5.6 03/10/2023     Lab Results   Component Value Date    BNP 73 08/27/2018    BNP 22 05/07/2018       Lab Results   Component Value Date    WBC 6.2 03/10/2023    HGB 16.2 03/10/2023    HCT 49.5 03/10/2023     03/10/2023    GRAN 9.4 (H) 05/21/2022    GRAN 74.7 (H) 05/21/2022     Lab Results   Component Value Date    CHOL 161 03/10/2023    HDL 56 03/10/2023    LDLCALC 91 03/10/2023    TRIG 54 03/10/2023        Outpatient Encounter Medications as of 2/6/2025   Medication Sig Dispense Refill    allopurinoL (ZYLOPRIM) 300 MG tablet Take 1 tablet (300 mg total) by mouth once daily. (Patient taking differently: Take 300 mg by mouth daily as needed.) 90 tablet 0    colchicine (COLCRYS) 0.6 mg tablet TAKE 1 TABLET BY MOUTH TWICE A DAY (Patient taking differently: Take 0.6 mg by mouth daily as needed.) 180 tablet 0    fluticasone propionate (FLONASE) 50 mcg/actuation nasal spray 1 spray (50 mcg total) by Each Nostril route once daily. 11.1 mL 0    indomethacin (INDOCIN) 50 MG capsule Take 1 capsule (50 mg total) by mouth 3 (three) times daily with meals. 30 capsule 0    azithromycin (ZITHROMAX) 500 MG tablet Take 1 tablet (500 mg total) by mouth once daily. for 7 days 7 tablet 0    benzonatate (TESSALON) 100 MG capsule Take 1 capsule (100 mg total) by mouth 3 (three) times daily as needed for Cough. 40 capsule 0    methylPREDNISolone (MEDROL DOSEPACK) 4 mg tablet use as directed 21 tablet 0    WESTUSSIN DM,  DEXCHLORPHENIR, 1-5-10 mg/5 mL Syrp PLEASE SEE ATTACHED FOR DETAILED DIRECTIONS      [DISCONTINUED] chlorthalidone (HYGROTEN) 25 MG Tab Take 1 tablet (25 mg total) by mouth once daily. 90 tablet 0    [DISCONTINUED] hydrOXYzine pamoate (VISTARIL) 50 MG Cap Take 50 mg by mouth 3 (three) times daily.      [DISCONTINUED] mupirocin (BACTROBAN) 2 % ointment Apply topically 2 (two) times daily. 20 g 0    [DISCONTINUED] predniSONE (DELTASONE) 10 MG tablet 2 tabs (20mg) for 2 days, then 1 tab (10mg) for 3 days. 7 tablet 0    [DISCONTINUED] sulfamethoxazole-trimethoprim 800-160mg (BACTRIM DS) 800-160 mg Tab Take 1 tablet by mouth 2 (two) times daily.      [DISCONTINUED] triamcinolone acetonide 0.5% (KENALOG) 0.5 % Crea Apply topically.       No facility-administered encounter medications on file as of 2/6/2025.

## 2025-02-06 NOTE — PATIENT INSTRUCTIONS
IMPRESSION:  - Considering flu based on symptoms and recent exposure  - Evaluating for potential bacterial infection given persistent symptoms since previous doxycycline treatment  - Assessing hydration status and ruling out kidney involvement  - Noted borderline blood pressure from previous visit  - Observed ear fullness, potentially requiring annual flushing  - Identified mass on upper mid chest wall, likely sebaceous cyst requiring surgical removal    PLAN SUMMARY:  - Ordered flu and strep swabs  - Offered referral for sebaceous cyst removal to Dr. Licona or Dr. Garcia  - Advised to continue hydrating well  - If flu test positive, initiate Tamiflu  - If flu test negative, consider prescribing Augmentin instead of doxycycline    J06.9 UPPER RESPIRATORY TRACT INFECTION, UNSPECIFIED TYPE:  - Ordered flu and strep swabs (if necessary after throat exam).  - Explained Tamiflu's mechanism of action in shortening influenza duration and potentially limiting symptoms.  - If flu test is positive, will initiate Tamiflu; if negative, will consider prescribing Augmentin as an alternative to previously prescribed doxycycline.  - Advised patient to continue hydrating well.    L72.9 CYST OF SKIN:  - Discussed sebaceous cyst removal procedure, emphasizing the necessity for complete excision including the capsule.  - Offered to submit a referral for the procedure to either Dr. Licona or Dr. Garcia.

## 2025-02-08 ENCOUNTER — NURSE TRIAGE (OUTPATIENT)
Dept: ADMINISTRATIVE | Facility: CLINIC | Age: 60
End: 2025-02-08
Payer: COMMERCIAL

## 2025-02-08 ENCOUNTER — OCHSNER VIRTUAL EMERGENCY DEPARTMENT (OUTPATIENT)
Facility: CLINIC | Age: 60
End: 2025-02-08
Payer: COMMERCIAL

## 2025-02-08 DIAGNOSIS — J06.9 UPPER RESPIRATORY TRACT INFECTION, UNSPECIFIED TYPE: Primary | ICD-10-CM

## 2025-02-08 RX ORDER — METHYLPREDNISOLONE 4 MG/1
TABLET ORAL
Qty: 21 EACH | Refills: 0 | Status: SHIPPED | OUTPATIENT
Start: 2025-02-08 | End: 2025-03-01

## 2025-02-08 NOTE — PLAN OF CARE-OVED
[facility] Emergency Department Plan of Care Note    Referral source: Nurse On-Call      Reason for consult: Cough      Additional History: Patient's wife states patient was seen in the clinic on 02/06/25 and diagnosed with an Upper Respiratory Tract Infection. Wife states patient was prescribed a Medrol Dosepack /Methyprednisolone 4mg and the prescription was sent to [facility]. Patient's wife states that the [facility] is no longer covered by their Insurance and is requesting patient's prescription for Methyprednisolone 4 mg be sent to [facility] #[id number] , [address], [place], [place].       Disposition recommended: Treat in place      Additional Recommendations: no

## 2025-02-08 NOTE — TELEPHONE ENCOUNTER
Patient's wife states patient was seen in the clinic on 02/06/25 and diagnosed with an Upper Respiratory Tract Infection. Wife states patient was prescribed a Medrol Dosepack /Methyprednisolone 4mg and the prescription was sent to [facility]. Patient's wife states that the [facility] is no longer covered by their Insurance and is requesting patient's prescription for Methyprednisolone 4 mg be sent to [facility] #[id number] , [address], [place]     [facility] On Call Provider, Dr. Robb Alfaro, advised that he will submit patient's prescription to [facility] #[id number] on today.     Patient's wife advised that patient's prescription for Methyprednisolone 4 mg will be submitted to [facility] #[id number] on today. Patient's wife also advised to contact the [facility] for any additional questions/symptoms. Patient's wife states understanding of care advice.     Reason for Disposition   [1] Caller has URGENT medicine question about med that PCP or specialist prescribed AND [2] triager unable to answer question    Additional Information   Negative: [1] Intentional drug overdose AND [2] suicidal thoughts or ideas   Negative: Drug overdose and triager unable to answer question   Negative: Caller requesting a renewal or refill of a medicine patient is currently taking   Negative: Caller requesting information unrelated to medicine   Negative: Caller requesting information about COVID-19 Vaccine   Negative: Caller requesting information about Emergency Contraception   Negative: Caller requesting information about Combined Birth Control Pills   Negative: Caller requesting information about Progestin Birth Control Pills   Negative: Caller requesting information about post-op pain or medicines   Negative: Caller requesting a prescription antibiotic (such as Penicillin) for Strep throat and has a positive culture result   Negative: Caller requesting a prescription anti-viral med (such  as Tamiflu) and has influenza (flu) symptoms   Negative: Immunization reaction suspected   Negative: Rash while taking a medicine or within 3 days of stopping it   Negative: [1] Asthma and [2] having symptoms of asthma (cough, wheezing, etc.)   Negative: [1] Symptom of illness (e.g., headache, abdominal pain, earache, vomiting) AND [2] more than mild   Negative: Breastfeeding questions about mother's medicines and diet   Negative: MORE THAN A DOUBLE DOSE of a prescription or over-the-counter (OTC) drug   Negative: [1] DOUBLE DOSE (an extra dose or lesser amount) of prescription drug AND [2] any symptoms (e.g., dizziness, nausea, pain, sleepiness)   Negative: [1] DOUBLE DOSE (an extra dose or lesser amount) of over-the-counter (OTC) drug AND [2] any symptoms (e.g., dizziness, nausea, pain, sleepiness)   Negative: Took another person's prescription drug   Negative: [1] DOUBLE DOSE (an extra dose or lesser amount) of prescription drug AND [2] NO symptoms  (Exception: A double dose of antibiotics.)   Negative: Diabetes drug error or overdose (e.g., took wrong type of insulin or took extra dose)   Negative: [1] Prescription not at pharmacy AND [2] was prescribed by PCP recently (Exception: Triager has access to EMR and prescription is recorded there. Go to Home Care and confirm for pharmacy.)   Negative: [1] Pharmacy calling with prescription question AND [2] triager unable to answer question    Protocols used: Medication Question Call-A-

## 2025-02-10 ENCOUNTER — TELEPHONE (OUTPATIENT)
Dept: PRIMARY CARE CLINIC | Facility: CLINIC | Age: 60
End: 2025-02-10
Payer: COMMERCIAL

## 2025-02-10 NOTE — TELEPHONE ENCOUNTER
----- Message from Wendy sent at 2/7/2025 12:52 PM CST -----  Contact: [phone]  Pt was seen yesterday and pharmacy received all scripts but the methylPREDNISolone (MEDROL DOSEPACK) 4 mg tablet. Can you please re send this to the pharmacy?         [facility] #[id number] - [place] - [address] AT [facility] [address]  [address] [address]  Phone: [phone] Fax: [phone]

## 2025-08-23 ENCOUNTER — PATIENT MESSAGE (OUTPATIENT)
Dept: ADMINISTRATIVE | Facility: HOSPITAL | Age: 60
End: 2025-08-23
Payer: COMMERCIAL

## 2025-08-24 DIAGNOSIS — Z12.11 SCREENING FOR COLON CANCER: ICD-10-CM
